# Patient Record
Sex: MALE | Race: WHITE | NOT HISPANIC OR LATINO | Employment: FULL TIME | URBAN - METROPOLITAN AREA
[De-identification: names, ages, dates, MRNs, and addresses within clinical notes are randomized per-mention and may not be internally consistent; named-entity substitution may affect disease eponyms.]

---

## 2017-03-16 ENCOUNTER — HOSPITAL ENCOUNTER (OUTPATIENT)
Dept: RADIOLOGY | Facility: CLINIC | Age: 62
Discharge: HOME | End: 2017-03-16
Payer: COMMERCIAL

## 2017-03-16 ENCOUNTER — TRANSCRIBE ORDERS (OUTPATIENT)
Dept: RADIOLOGY | Facility: CLINIC | Age: 62
End: 2017-03-16

## 2017-03-16 DIAGNOSIS — M79.671 RIGHT FOOT PAIN: Primary | ICD-10-CM

## 2017-03-16 PROCEDURE — 73630 X-RAY EXAM OF FOOT: CPT

## 2017-06-13 ENCOUNTER — TRANSCRIBE ORDERS (OUTPATIENT)
Dept: SLEEP CENTER | Facility: HOSPITAL | Age: 62
End: 2017-06-13

## 2017-06-13 ENCOUNTER — HOSPITAL ENCOUNTER (OUTPATIENT)
Dept: SLEEP CENTER | Facility: HOSPITAL | Age: 62
Discharge: HOME | End: 2017-06-13
Attending: INTERNAL MEDICINE
Payer: COMMERCIAL

## 2017-06-13 DIAGNOSIS — G47.33 OSA (OBSTRUCTIVE SLEEP APNEA): Primary | ICD-10-CM

## 2017-06-13 DIAGNOSIS — Z99.89 OSA ON CPAP: ICD-10-CM

## 2017-06-13 DIAGNOSIS — G47.33 OSA ON CPAP: ICD-10-CM

## 2017-08-24 ENCOUNTER — TRANSCRIBE ORDERS (OUTPATIENT)
Dept: ADMINISTRATIVE | Facility: HOSPITAL | Age: 62
End: 2017-08-24

## 2017-08-24 DIAGNOSIS — G47.30 SLEEP APNEA, UNSPECIFIED TYPE: Primary | ICD-10-CM

## 2017-10-10 ENCOUNTER — TRANSCRIBE ORDERS (OUTPATIENT)
Dept: SLEEP CENTER | Facility: HOSPITAL | Age: 62
End: 2017-10-10

## 2017-10-10 ENCOUNTER — HOSPITAL ENCOUNTER (OUTPATIENT)
Dept: SLEEP CENTER | Facility: HOSPITAL | Age: 62
Discharge: HOME | End: 2017-10-10
Attending: INTERNAL MEDICINE
Payer: COMMERCIAL

## 2017-10-10 DIAGNOSIS — G47.33 OSA (OBSTRUCTIVE SLEEP APNEA): Primary | ICD-10-CM

## 2017-10-10 DIAGNOSIS — G47.30 SLEEP APNEA, UNSPECIFIED TYPE: ICD-10-CM

## 2017-10-10 NOTE — PROGRESS NOTES
Follow-Up Note - [facility]  60 y o  male  XWX:4/14/3216  \[facility]\"":[id number]    CC: I saw this patient for follow-up in clinic today for his Sleep Disordered Breathing, Coexisting Sleep and Medical Problems  Past medical, Family, Social History, ROS and medications were reviewed  He reports frequent coughing due to respiratory infection for which he is under care  Herewith my findings in summary  Full Details are available on request      HPI:  He was prescribed positive airway pressure therapy and reports regular use   7 nights/week for 4 hours or more a night  He experiencing no major difficulties tolerating or adverse effectsis and is benefiting from  use  He reports a dry mouth because the humidify is not working as well as his old machine  Compliance data confirms use > 4 hours 100% of the time  The AHI is 1 4 per hour at 10 cm H2O      Sleep Routine: Alta Schaffer reports getting 7 hours sleep; he is having no difficulty initiating or maintaining sleep    He awakens feeling refreshed and reports some excessive drowsiness  He rated himself at 9 /24 on the [place] sleepiness scale  He naps when he has the opportunity  On Exam:   Physical findings are essentially unchanged from previous  Impression :   1  Obstructive Sleep Apnea  2  Mild hypersomnolence  3  Insufficient sleep may underlie this  4  Dry mouth  5  Respiratory infection  6  Comorbidities:  Hypertension, environmental allergies and obesity    Plan:  1  Treatment with  PAP is medically necessary and Alta Schaffer is agreable to use  He will continue 10 cm  pressure  2  Instruction on care of equipment and strategies to improve comfort with use of PAP were discussed  A prescription to replace supplies as needed was provided and care coordinated  He will meet with the DME provider in this regard  3  Need for compliance with therapy and weight reduction were emphasized     4  I advised him to increase the amount of sleep that he gets  5  Follow-up is advised in 1 year or sooner if needed to monitor progress and to adjust therapy  Thank you for allowing me to participate in the care of this patient      Sincerely,    Navi Poon MD  Board Certified Specialist

## 2018-01-10 NOTE — RESULT NOTES
Message   Recorded as Task   Date: 01/04/2016 10:01 AM, Created By: Candido Primrose   Task Name: Follow Up   Assigned To: [facility] [place] procedure,Team   Regarding Patient: Leon Abraham, Status: In Progress   Mireille Nighat - 04 Jan 1415 22:11 AM     TASK CREATED  S/P RIGHT L4-5 TFESI ON 12/28/2015 WITH DR Krystal Bacon - RT L3-4 TFESI SCHEDULED ON 1/11/2016     Candido Primrose - 05 Jan 4810 9:57 AM     TASK IN PROGRESS   Candido Primrose - 05 Jan 3715 0:21 AM     TASK EDITED  Lm on VM to [facility]   Breanna Coelho - 14 Jan 2016 9:55 AM     TASK EDITED  Pt arrived for injection scheduled on 1/11/16

## 2018-01-11 NOTE — MISCELLANEOUS
Message   Recorded as Task   Date: 04/15/2016 02:13 PM, Created By: Warner Mo   Task Name: Follow Up   Assigned To: [facility] clerical,Team   Regarding Patient: Kirti Limon, Status: In Progress   Comment:    Chica Siegel - 15 Apr 2016 2:13 PM     TASK CREATED  Caller: Self; Other; [phone]  [facility] 4/15/2016 @ 1058  Pt calling for Eddie Gonzales re: an insurance billing question  Pls cb  Harriett Lindo - 15 Apr 2016 2:21 PM     TASK REPLIED TO: Previously Assigned To [facility] clerical,Team  LMOM for patient to return my call  Harriett Lindo - 18 Apr 2016 1:55 PM     TASK IN Othella Hamman - 02 May 2016 12:26 PM     TASK REPLIED TO: Previously Assigned To [facility] clerical,Team  Left another message for patient  Provided him with the Central Billing office phone number to call with any billing questions [phone]        Active Problems    1  Chronic low back pain (724 2,338 29) (M54 5,G89 29)   2  Effusion of knee, unspecified laterality (719 06) (M25 469)   3  Herniated nucleus pulposus, L3-4 (722 10) (M51 26)   4  Herniated nucleus pulposus, L4-5 (722 10) (M51 26)   5  Lumbar foraminal stenosis (724 02) (M99 83)   6  Osteoarthritis of lumbosacral spine with myelopathy (721 42) (M47 16)   7  Primary osteoarthritis of left knee (715 16) (M17 12)   8  Primary osteoarthritis of right knee (715 16) (M17 11)   9  Right lumbar radiculitis (724 4) (M54 16)    Current Meds   1  Advil CAPS; Therapy: (Recorded:59Vsl6542) to Recorded   2  Lipitor 10 MG Oral Tablet (Atorvastatin Calcium); TAKE 1 TABLET DAILY; Therapy: [id number] to Recorded   3  Lisinopril 10 MG Oral Tablet; TAKE 1 TABLET DAILY; Therapy: [id number] to Recorded   4  Norvasc 5 MG Oral Tablet (AmLODIPine Besylate); TAKE 1 TABLET DAILY; Therapy: [id number] to Recorded   5  Osteo-Bi-Flex TABS; Therapy: (Recorded:32Ixd6879) to Recorded   6  Synvisc One 8 MG/ML Intra-articular Injectable; USE AS DIRECTED;    Therapy: [id number] to (Last Rx:31Oct2013) Ordered    Allergies    1  NO KNOWN DRUG ALLERGY    Signatures   Electronically signed by :  Melly Smyth, ; May  2 2016 12:26PM EST                       (Author)

## 2018-01-12 NOTE — MISCELLANEOUS
Message   Recorded as Task   Date: 03/08/2016 02:14 PM, Created By: Ida Munoz   Task Name: Follow Up   Assigned To: [facility] clerical,Team   Regarding Patient: Sophia Massey, Status: In Progress   CommentReda Carrel - 08 Mar 2016 2:14 PM     TASK CREATED  Caller: Self; General Medical Question; [phone] (Home); [phone] (Work)  Received VM from pt  on [facility] triage line from [phone] am  Pt  states that he has a question about a bill he has from his insurance company  Pt  requesting c/b  Carina Zapata - 17 Mar 2016 1:52 PM     TASK IN PROGRESS   Carina Zapata - 17 Mar 2016 2:14 PM     TASK REASSIGNED: Previously Assigned To [facility] clerical,Team  LMOM for pt to CB  See below  Delia Abdi - 25 Mar 2016 9:22 AM     TASK EDITED  S/W PT - GAVE HIM CBO #        Active Problems    1  Chronic low back pain (724 2,338 29) (M54 5,G89 29)   2  Effusion of knee, unspecified laterality (719 06) (M25 469)   3  Herniated nucleus pulposus, L3-4 (722 10) (M51 26)   4  Herniated nucleus pulposus, L4-5 (722 10) (M51 26)   5  Lumbar foraminal stenosis (724 02) (M99 83)   6  Osteoarthritis of lumbosacral spine with myelopathy (721 42) (M47 16)   7  Primary osteoarthritis of left knee (715 16) (M17 12)   8  Primary osteoarthritis of right knee (715 16) (M17 11)   9  Right lumbar radiculitis (724 4) (M54 16)    Current Meds   1  Advil CAPS; Therapy: (Recorded:85Own3785) to Recorded   2  Lipitor 10 MG Oral Tablet (Atorvastatin Calcium); TAKE 1 TABLET DAILY; Therapy: [id number] to Recorded   3  Lisinopril 10 MG Oral Tablet; TAKE 1 TABLET DAILY; Therapy: 14PXL4871 to Recorded   4  Norvasc 5 MG Oral Tablet (AmLODIPine Besylate); TAKE 1 TABLET DAILY; Therapy: 91UMU1739 to Recorded   5  Osteo-Bi-Flex TABS; Therapy: (Recorded:33Ezk3988) to Recorded   6  Synvisc One 8 MG/ML Intra-articular Injectable; USE AS DIRECTED; Therapy: [id number] to (Last Rx:31Oct2013) Ordered    Allergies    1   NO KNOWN DRUG ALLERGY    Signatures   Electronically signed by :  Barb Bustillos, ; Mar 25 2016  9:22AM EST                       (Author)

## 2018-01-13 NOTE — MISCELLANEOUS
Message   Recorded as Task   Date: 01/18/2016 09:53 AM, Created By: Paulina Gregorio   Task Name: Follow Up   Assigned To: [facility] ,Team   Regarding Patient: Elizabeth Chase, Status: In Progress   Comment:    Breanna Coelho - 18 Jan 2016 9:53 AM     TASK CREATED  S/p Right L3-4 TFESI on 1/11/16 w/Dr Rony Mckoy in [place]   F/u scheduled 2/8/16   Breanna Coelho - 20 Jan 2016 1:13 PM     TASK EDITED  1st attempt-lmom for f/u after injection   Breanna Coelho - 25 Jan 2016 12:35 PM     TASK EDITED  2nd attempt-lm to return call for f/u after injection   Breanna Coelho - 27 Jan 2016 3:49 PM     TASK REASSIGNED: Previously Assigned To [facility] [place] procedure,Team  Please send Can't reach you letter   Can't reach you letter sent to patients address  Active Problems    1  Effusion of knee, unspecified laterality (719 06) (M25 469)   2  Herniation of lumbar intervertebral disc with radiculopathy (722 10) (M51 16)   3  Lumbar foraminal stenosis (724 02) (M99 83)   4  Primary osteoarthritis of left knee (715 16) (M17 12)   5  Primary osteoarthritis of right knee (715 16) (M17 11)   6  Right lumbar radiculitis (724 4) (M54 16)    Current Meds   1  Advil CAPS; Therapy: (Recorded:99Dgz2847) to Recorded   2  Lipitor 10 MG Oral Tablet (Atorvastatin Calcium); TAKE 1 TABLET DAILY; Therapy: [id number] to Recorded   3  Lisinopril 10 MG Oral Tablet; TAKE 1 TABLET DAILY; Therapy: [id number] to Recorded   4  Norvasc 5 MG Oral Tablet (AmLODIPine Besylate); TAKE 1 TABLET DAILY; Therapy: 64SXZ0534 to Recorded   5  Osteo-Bi-Flex TABS; Therapy: (Recorded:23Bzm1703) to Recorded   6  Synvisc One 8 MG/ML Intra-articular Injectable; USE AS DIRECTED; Therapy: [id number] to (Last Rx:31Oct2013) Ordered    Allergies    1   NO KNOWN DRUG ALLERGY    Signatures   Electronically signed by : Noah Tijerina, ; Jan 28 2016 11:46AM EST                       (Author)

## 2018-06-18 ENCOUNTER — TRANSCRIBE ORDERS (OUTPATIENT)
Dept: SLEEP CENTER | Facility: HOSPITAL | Age: 63
End: 2018-06-18

## 2018-06-18 DIAGNOSIS — G47.33 OBSTRUCTIVE SLEEP APNEA: Primary | ICD-10-CM

## 2018-10-11 ENCOUNTER — TRANSCRIBE ORDERS (OUTPATIENT)
Dept: SLEEP CENTER | Facility: HOSPITAL | Age: 63
End: 2018-10-11

## 2018-10-11 DIAGNOSIS — Z99.89 DEPENDENCE ON OTHER ENABLING MACHINES AND DEVICES: ICD-10-CM

## 2018-10-11 DIAGNOSIS — G47.30 SLEEP APNEA: Primary | ICD-10-CM

## 2018-10-11 DIAGNOSIS — G47.33 OBSTRUCTIVE SLEEP APNEA: ICD-10-CM

## 2018-10-16 ENCOUNTER — OFFICE VISIT (OUTPATIENT)
Dept: SLEEP CENTER | Facility: HOSPITAL | Age: 63
End: 2018-10-16
Attending: INTERNAL MEDICINE
Payer: COMMERCIAL

## 2018-10-16 VITALS
SYSTOLIC BLOOD PRESSURE: 110 MMHG | WEIGHT: 211 LBS | HEIGHT: 71 IN | BODY MASS INDEX: 29.54 KG/M2 | DIASTOLIC BLOOD PRESSURE: 68 MMHG | HEART RATE: 72 BPM

## 2018-10-16 DIAGNOSIS — R68.2 DRY MOUTH: ICD-10-CM

## 2018-10-16 DIAGNOSIS — E66.3 OVERWEIGHT (BMI 25.0-29.9): ICD-10-CM

## 2018-10-16 DIAGNOSIS — J31.0 CHRONIC RHINITIS: ICD-10-CM

## 2018-10-16 DIAGNOSIS — G47.62 NOCTURNAL LEG CRAMPS: ICD-10-CM

## 2018-10-16 DIAGNOSIS — I10 ESSENTIAL HYPERTENSION: ICD-10-CM

## 2018-10-16 DIAGNOSIS — G47.33 OSA (OBSTRUCTIVE SLEEP APNEA): Primary | ICD-10-CM

## 2018-10-16 DIAGNOSIS — Z99.89 DEPENDENCE ON OTHER ENABLING MACHINES AND DEVICES: ICD-10-CM

## 2018-10-16 PROCEDURE — 99214 OFFICE O/P EST MOD 30 MIN: CPT | Performed by: INTERNAL MEDICINE

## 2018-10-16 RX ORDER — LISINOPRIL 10 MG/1
1 TABLET ORAL DAILY
COMMUNITY
Start: 2013-10-29

## 2018-10-16 RX ORDER — ATORVASTATIN CALCIUM 10 MG/1
1 TABLET, FILM COATED ORAL DAILY
COMMUNITY
Start: 2013-10-29

## 2018-10-16 NOTE — PROGRESS NOTES
Follow-Up Note - [facility]  58 y o  male  AFF:4/60/2459  HEW:[id number]    CC: I saw this patient for follow-up in clinic today for his Sleep Disordered Breathing, Coexisting Sleep and Medical Problems  PFSH, Problem List, Medications & Allergies were reviewed in EMR  Interval changes: none reported  He  has no past medical history on file  He has a current medication list which includes the following prescription(s): atorvastatin and lisinopril  ROS: Reviewed (see attached)  He has had approximately 45 lb intentional weight loss in the past 1 year  HPI: Whit Valencia reports no  difficulty tolerating PAP; With respect to compliance, data download shows:  using PAP > 4 hours/night 83% of the time  BERENICE (estimated) 2 9/hour at  pressure of 10cm H2O     · He is experiencing some adverse effects: dry mouth   · He is benefitting from use   · He reports leg cramps that awaken him from sleep  Sleep Routine: He reports getting 7 5 hours sleep  ; he having difficulty initiating sleep, but not maintaining sleep   He awakens with the aid of an alarm feeling refreshed  He denied excessive drowsiness   He rated himself at Total score: 10 /24 on the [place] sleepiness scale  Habits: reports that he quit smoking about 35 years ago  He has never used smokeless tobacco ,  reports that he drinks alcohol ,  reports that he does not use drugs  , Caffeine use: limited , Exercise routine: regular    EXAM: /68   Pulse 72   Ht 5' 11" (1 803 m)   Wt 95 7 kg (211 lb)   BMI 29 43 kg/m²      Patient is well groomed; well appearing; no deformity  He is alert, orientated, cooperative and in no distress  Mental state appears normal   Skin/Extrem: warm & dry; col & hydration normal; no edema  EOMI; There are no facial pressure marks or rashes  Neck Circumference: 44 cm  Nasal airway is patent  Oral airway is crowded  Mucous membranes appeared normal  RRR;  Resp effort is normal  There is truncal obesity  Gait & Balance normal   Speech is clear & coherent  IMPRESSION: Primary Sleep/Secondary(to Medical or Psych conditions) & comorbidities   1  CARMEN (obstructive sleep apnea)  Sleep F/U  - established patient    Sleep F/U  - established patient    Ambulatory referral to Sleep Medicine    Ambulatory referral to Sleep Medicine    PAP DME Resupply/Reorder    PAP DME Pressure Change   2  Dependence on other enabling machines and devices  Ambulatory referral to Sleep Medicine   3  Dry mouth     4  Nocturnal leg cramps     5  Chronic rhinitis     6  Essential hypertension     7  Overweight (BMI 25 0-29  9)         PLAN:  1  Treatment with  PAP is medically necessary and Darlene Niño is agreable to continue use  2  Care of equipment, methods to improve comfort using PAP and importance of compliance with therapy were discussed  I advised on strategies to improve dryness  Specifically, using Biotene mouthwash or Xylimelt  3  Pressure setting:  Change 8-10 cm H2O in view of dryness and weight loss  I suggested re-evaluation by way of an overnight sleep study but he declined  4  Rx provided to replace supplies and Care coordinated with DME provider  5  Strategies for weight reduction were discussed  6  I suggested adding magnesium supplement for the leg cramps  7  Follow-up is advised in 1 year or sooner if needed to monitor progress, compliance and to adjust therapy  Thank you for allowing me to participate in the care of this patient      Sincerely,    Authenticated electronically by Mao Interiano MD on 48/99/76   Board Certified Specialist

## 2019-10-29 ENCOUNTER — TELEPHONE (OUTPATIENT)
Dept: SLEEP CENTER | Facility: HOSPITAL | Age: 64
End: 2019-10-29

## 2020-05-28 ENCOUNTER — TELEPHONE (OUTPATIENT)
Dept: GASTROENTEROLOGY | Facility: CLINIC | Age: 65
End: 2020-05-28

## 2020-06-15 ENCOUNTER — HOSPITAL ENCOUNTER (EMERGENCY)
Facility: HOSPITAL | Age: 65
Discharge: HOME | End: 2020-06-15
Attending: EMERGENCY MEDICINE
Payer: COMMERCIAL

## 2020-06-15 ENCOUNTER — APPOINTMENT (EMERGENCY)
Dept: CT IMAGING | Facility: HOSPITAL | Age: 65
End: 2020-06-15
Payer: COMMERCIAL

## 2020-06-15 VITALS
BODY MASS INDEX: 29.43 KG/M2 | DIASTOLIC BLOOD PRESSURE: 96 MMHG | SYSTOLIC BLOOD PRESSURE: 173 MMHG | HEART RATE: 65 BPM | OXYGEN SATURATION: 97 % | RESPIRATION RATE: 19 BRPM | WEIGHT: 211 LBS | TEMPERATURE: 98 F

## 2020-06-15 DIAGNOSIS — S01.01XA LACERATION OF SCALP, INITIAL ENCOUNTER: Primary | ICD-10-CM

## 2020-06-15 PROCEDURE — 12001 RPR S/N/AX/GEN/TRNK 2.5CM/<: CPT | Performed by: EMERGENCY MEDICINE

## 2020-06-15 PROCEDURE — 99284 EMERGENCY DEPT VISIT MOD MDM: CPT

## 2020-06-15 PROCEDURE — 70450 CT HEAD/BRAIN W/O DYE: CPT

## 2020-06-15 PROCEDURE — 99284 EMERGENCY DEPT VISIT MOD MDM: CPT | Performed by: EMERGENCY MEDICINE

## 2020-06-15 RX ORDER — TRANEXAMIC ACID 100 MG/ML
1000 INJECTION, SOLUTION INTRAVENOUS ONCE
Status: DISCONTINUED | OUTPATIENT
Start: 2020-06-15 | End: 2020-06-16 | Stop reason: HOSPADM

## 2020-06-15 RX ORDER — GINSENG 100 MG
1 CAPSULE ORAL ONCE
Status: COMPLETED | OUTPATIENT
Start: 2020-06-15 | End: 2020-06-15

## 2020-06-15 RX ORDER — NAPROXEN 500 MG/1
TABLET ORAL EVERY 12 HOURS
COMMUNITY
Start: 2017-03-16 | End: 2022-05-26 | Stop reason: ALTCHOICE

## 2020-06-15 RX ORDER — LIDOCAINE HYDROCHLORIDE AND EPINEPHRINE 10; 10 MG/ML; UG/ML
1 INJECTION, SOLUTION INFILTRATION; PERINEURAL ONCE
Status: COMPLETED | OUTPATIENT
Start: 2020-06-15 | End: 2020-06-15

## 2020-06-15 RX ORDER — ASPIRIN 81 MG/1
81 TABLET, CHEWABLE ORAL DAILY
COMMUNITY

## 2020-06-15 RX ORDER — COLCHICINE 0.6 MG/1
CAPSULE ORAL AS NEEDED
COMMUNITY
Start: 2020-05-29

## 2020-06-15 RX ADMIN — BACITRACIN 1 SMALL APPLICATION: 500 OINTMENT TOPICAL at 23:10

## 2020-06-15 RX ADMIN — LIDOCAINE HYDROCHLORIDE AND EPINEPHRINE 1 ML: 10; 10 INJECTION, SOLUTION INFILTRATION; PERINEURAL at 20:41

## 2020-07-08 ENCOUNTER — TELEPHONE (OUTPATIENT)
Dept: GASTROENTEROLOGY | Facility: CLINIC | Age: 65
End: 2020-07-08

## 2020-10-15 ENCOUNTER — TELEMEDICINE (OUTPATIENT)
Dept: GASTROENTEROLOGY | Facility: CLINIC | Age: 65
End: 2020-10-15

## 2020-10-15 VITALS — WEIGHT: 215 LBS | BODY MASS INDEX: 30.78 KG/M2 | HEIGHT: 70 IN

## 2020-10-15 DIAGNOSIS — Z80.0 FAMILY HISTORY OF COLON CANCER: ICD-10-CM

## 2020-10-15 DIAGNOSIS — Z86.010 HISTORY OF COLON POLYPS: Primary | ICD-10-CM

## 2020-10-15 RX ORDER — METOPROLOL SUCCINATE 50 MG/1
50 TABLET, EXTENDED RELEASE ORAL DAILY
COMMUNITY

## 2020-10-21 ENCOUNTER — HOSPITAL ENCOUNTER (OUTPATIENT)
Dept: GASTROENTEROLOGY | Facility: AMBULATORY SURGERY CENTER | Age: 65
Discharge: HOME | End: 2020-10-21
Payer: COMMERCIAL

## 2020-10-21 ENCOUNTER — ANESTHESIA EVENT (OUTPATIENT)
Dept: GASTROENTEROLOGY | Facility: AMBULATORY SURGERY CENTER | Age: 65
End: 2020-10-21

## 2020-10-21 ENCOUNTER — ANESTHESIA (OUTPATIENT)
Dept: GASTROENTEROLOGY | Facility: AMBULATORY SURGERY CENTER | Age: 65
End: 2020-10-21

## 2020-10-21 VITALS — HEART RATE: 74 BPM

## 2020-10-21 VITALS
BODY MASS INDEX: 30.78 KG/M2 | DIASTOLIC BLOOD PRESSURE: 69 MMHG | TEMPERATURE: 97.1 F | WEIGHT: 215 LBS | OXYGEN SATURATION: 96 % | HEART RATE: 67 BPM | RESPIRATION RATE: 18 BRPM | SYSTOLIC BLOOD PRESSURE: 114 MMHG | HEIGHT: 70 IN

## 2020-10-21 DIAGNOSIS — Z80.0 FAMILY HISTORY OF COLON CANCER: ICD-10-CM

## 2020-10-21 DIAGNOSIS — Z86.010 HISTORY OF COLON POLYPS: ICD-10-CM

## 2020-10-21 PROCEDURE — 45380 COLONOSCOPY AND BIOPSY: CPT | Performed by: INTERNAL MEDICINE

## 2020-10-21 PROCEDURE — 88305 TISSUE EXAM BY PATHOLOGIST: CPT | Performed by: PATHOLOGY

## 2020-10-21 RX ORDER — IBUPROFEN 200 MG
200 TABLET ORAL EVERY 6 HOURS PRN
COMMUNITY

## 2020-10-21 RX ORDER — PROPOFOL 10 MG/ML
INJECTION, EMULSION INTRAVENOUS AS NEEDED
Status: DISCONTINUED | OUTPATIENT
Start: 2020-10-21 | End: 2020-10-21

## 2020-10-21 RX ORDER — SODIUM CHLORIDE 9 MG/ML
75 INJECTION, SOLUTION INTRAVENOUS CONTINUOUS
Status: DISCONTINUED | OUTPATIENT
Start: 2020-10-21 | End: 2020-10-25 | Stop reason: HOSPADM

## 2020-10-21 RX ADMIN — PROPOFOL 50 MG: 10 INJECTION, EMULSION INTRAVENOUS at 08:23

## 2020-10-21 RX ADMIN — PROPOFOL 50 MG: 10 INJECTION, EMULSION INTRAVENOUS at 08:33

## 2020-10-21 RX ADMIN — PROPOFOL 150 MG: 10 INJECTION, EMULSION INTRAVENOUS at 08:19

## 2020-10-21 RX ADMIN — PROPOFOL 50 MG: 10 INJECTION, EMULSION INTRAVENOUS at 08:21

## 2020-10-21 RX ADMIN — PROPOFOL 50 MG: 10 INJECTION, EMULSION INTRAVENOUS at 08:28

## 2020-10-21 RX ADMIN — SODIUM CHLORIDE 75 ML/HR: 9 INJECTION, SOLUTION INTRAVENOUS at 07:55

## 2020-10-22 ENCOUNTER — OFFICE VISIT (OUTPATIENT)
Dept: WOUND CARE | Facility: HOSPITAL | Age: 65
End: 2020-10-22
Payer: COMMERCIAL

## 2020-10-22 VITALS
RESPIRATION RATE: 18 BRPM | HEIGHT: 70 IN | DIASTOLIC BLOOD PRESSURE: 98 MMHG | HEART RATE: 60 BPM | BODY MASS INDEX: 30.78 KG/M2 | SYSTOLIC BLOOD PRESSURE: 158 MMHG | TEMPERATURE: 97.2 F | WEIGHT: 215 LBS

## 2020-10-22 DIAGNOSIS — T25.021A BURN OF UNSPECIFIED DEGREE OF RIGHT FOOT, INITIAL ENCOUNTER: Primary | ICD-10-CM

## 2020-10-22 DIAGNOSIS — T31.0 BURN (ANY DEGREE) INVOLVING LESS THAN 10% OF BODY SURFACE: ICD-10-CM

## 2020-10-22 PROCEDURE — 99213 OFFICE O/P EST LOW 20 MIN: CPT | Performed by: FAMILY MEDICINE

## 2020-10-22 PROCEDURE — 16020 DRESS/DEBRID P-THICK BURN S: CPT | Performed by: FAMILY MEDICINE

## 2020-10-22 PROCEDURE — 99203 OFFICE O/P NEW LOW 30 MIN: CPT | Performed by: FAMILY MEDICINE

## 2020-10-22 PROCEDURE — G0463 HOSPITAL OUTPT CLINIC VISIT: HCPCS | Performed by: FAMILY MEDICINE

## 2020-10-22 RX ORDER — LIDOCAINE HYDROCHLORIDE 40 MG/ML
5 SOLUTION TOPICAL ONCE
Status: COMPLETED | OUTPATIENT
Start: 2020-10-22 | End: 2020-10-22

## 2020-10-22 RX ADMIN — LIDOCAINE HYDROCHLORIDE 5 ML: 40 SOLUTION TOPICAL at 10:51

## 2020-10-29 ENCOUNTER — OFFICE VISIT (OUTPATIENT)
Dept: WOUND CARE | Facility: HOSPITAL | Age: 65
End: 2020-10-29
Payer: COMMERCIAL

## 2020-10-29 VITALS
HEART RATE: 64 BPM | SYSTOLIC BLOOD PRESSURE: 130 MMHG | TEMPERATURE: 96.5 F | RESPIRATION RATE: 18 BRPM | DIASTOLIC BLOOD PRESSURE: 90 MMHG

## 2020-10-29 DIAGNOSIS — T25.021A BURN OF UNSPECIFIED DEGREE OF RIGHT FOOT, INITIAL ENCOUNTER: Primary | ICD-10-CM

## 2020-10-29 PROCEDURE — 16020 DRESS/DEBRID P-THICK BURN S: CPT | Performed by: FAMILY MEDICINE

## 2020-10-29 RX ORDER — LIDOCAINE HYDROCHLORIDE 40 MG/ML
5 SOLUTION TOPICAL ONCE
Status: COMPLETED | OUTPATIENT
Start: 2020-10-29 | End: 2020-10-29

## 2020-10-29 RX ADMIN — LIDOCAINE HYDROCHLORIDE 5 ML: 40 SOLUTION TOPICAL at 10:30

## 2020-11-05 ENCOUNTER — OFFICE VISIT (OUTPATIENT)
Dept: WOUND CARE | Facility: HOSPITAL | Age: 65
End: 2020-11-05
Payer: COMMERCIAL

## 2020-11-05 VITALS
HEART RATE: 76 BPM | DIASTOLIC BLOOD PRESSURE: 70 MMHG | RESPIRATION RATE: 18 BRPM | TEMPERATURE: 96.7 F | SYSTOLIC BLOOD PRESSURE: 130 MMHG

## 2020-11-05 DIAGNOSIS — T31.0 BURN (ANY DEGREE) INVOLVING LESS THAN 10% OF BODY SURFACE: ICD-10-CM

## 2020-11-05 DIAGNOSIS — T25.021A BURN OF UNSPECIFIED DEGREE OF RIGHT FOOT, INITIAL ENCOUNTER: Primary | ICD-10-CM

## 2020-11-05 PROCEDURE — 16020 DRESS/DEBRID P-THICK BURN S: CPT | Performed by: FAMILY MEDICINE

## 2020-11-05 PROCEDURE — NC001 PR NO CHARGE: Performed by: FAMILY MEDICINE

## 2020-11-05 RX ORDER — LIDOCAINE HYDROCHLORIDE 40 MG/ML
1 SOLUTION TOPICAL ONCE
Status: COMPLETED | OUTPATIENT
Start: 2020-11-05 | End: 2020-11-05

## 2020-11-05 RX ADMIN — LIDOCAINE HYDROCHLORIDE 1 ML: 40 SOLUTION TOPICAL at 09:58

## 2020-11-12 ENCOUNTER — OFFICE VISIT (OUTPATIENT)
Dept: WOUND CARE | Facility: HOSPITAL | Age: 65
End: 2020-11-12
Payer: COMMERCIAL

## 2020-11-12 VITALS
HEART RATE: 70 BPM | SYSTOLIC BLOOD PRESSURE: 134 MMHG | RESPIRATION RATE: 18 BRPM | DIASTOLIC BLOOD PRESSURE: 84 MMHG | TEMPERATURE: 96.5 F

## 2020-11-12 DIAGNOSIS — T31.0 BURN (ANY DEGREE) INVOLVING LESS THAN 10% OF BODY SURFACE: Primary | ICD-10-CM

## 2020-11-12 PROCEDURE — 16020 DRESS/DEBRID P-THICK BURN S: CPT | Performed by: FAMILY MEDICINE

## 2020-11-12 RX ORDER — LIDOCAINE HYDROCHLORIDE 40 MG/ML
1 SOLUTION TOPICAL ONCE
Status: COMPLETED | OUTPATIENT
Start: 2020-11-12 | End: 2020-11-12

## 2020-11-12 RX ADMIN — LIDOCAINE HYDROCHLORIDE 1 ML: 40 SOLUTION TOPICAL at 10:09

## 2020-11-19 ENCOUNTER — OFFICE VISIT (OUTPATIENT)
Dept: WOUND CARE | Facility: HOSPITAL | Age: 65
End: 2020-11-19
Payer: COMMERCIAL

## 2020-11-19 VITALS
DIASTOLIC BLOOD PRESSURE: 82 MMHG | HEART RATE: 80 BPM | TEMPERATURE: 95.4 F | RESPIRATION RATE: 20 BRPM | SYSTOLIC BLOOD PRESSURE: 140 MMHG

## 2020-11-19 DIAGNOSIS — T31.0 BURN (ANY DEGREE) INVOLVING LESS THAN 10% OF BODY SURFACE: Primary | ICD-10-CM

## 2020-11-19 PROCEDURE — 99213 OFFICE O/P EST LOW 20 MIN: CPT | Performed by: FAMILY MEDICINE

## 2020-11-19 PROCEDURE — 99212 OFFICE O/P EST SF 10 MIN: CPT | Performed by: FAMILY MEDICINE

## 2020-11-19 PROCEDURE — G0463 HOSPITAL OUTPT CLINIC VISIT: HCPCS | Performed by: FAMILY MEDICINE

## 2020-12-03 ENCOUNTER — OFFICE VISIT (OUTPATIENT)
Dept: WOUND CARE | Facility: HOSPITAL | Age: 65
End: 2020-12-03
Payer: COMMERCIAL

## 2020-12-03 VITALS
RESPIRATION RATE: 18 BRPM | DIASTOLIC BLOOD PRESSURE: 90 MMHG | HEART RATE: 68 BPM | TEMPERATURE: 97 F | SYSTOLIC BLOOD PRESSURE: 132 MMHG

## 2020-12-03 DIAGNOSIS — T31.0 BURN (ANY DEGREE) INVOLVING LESS THAN 10% OF BODY SURFACE: ICD-10-CM

## 2020-12-03 DIAGNOSIS — T25.021A BURN OF UNSPECIFIED DEGREE OF RIGHT FOOT, INITIAL ENCOUNTER: Primary | ICD-10-CM

## 2020-12-03 PROCEDURE — 11042 DBRDMT SUBQ TIS 1ST 20SQCM/<: CPT | Performed by: FAMILY MEDICINE

## 2020-12-03 PROCEDURE — 16020 DRESS/DEBRID P-THICK BURN S: CPT | Performed by: FAMILY MEDICINE

## 2020-12-10 ENCOUNTER — OFFICE VISIT (OUTPATIENT)
Dept: WOUND CARE | Facility: HOSPITAL | Age: 65
End: 2020-12-10
Payer: COMMERCIAL

## 2020-12-10 VITALS
SYSTOLIC BLOOD PRESSURE: 124 MMHG | HEART RATE: 80 BPM | RESPIRATION RATE: 16 BRPM | TEMPERATURE: 97.5 F | DIASTOLIC BLOOD PRESSURE: 80 MMHG

## 2020-12-10 DIAGNOSIS — T31.0 BURN (ANY DEGREE) INVOLVING LESS THAN 10% OF BODY SURFACE: ICD-10-CM

## 2020-12-10 DIAGNOSIS — T25.021A BURN OF UNSPECIFIED DEGREE OF RIGHT FOOT, INITIAL ENCOUNTER: Primary | ICD-10-CM

## 2020-12-10 PROCEDURE — 16020 DRESS/DEBRID P-THICK BURN S: CPT | Performed by: FAMILY MEDICINE

## 2020-12-10 RX ORDER — LIDOCAINE HYDROCHLORIDE 40 MG/ML
5 SOLUTION TOPICAL ONCE
Status: COMPLETED | OUTPATIENT
Start: 2020-12-10 | End: 2020-12-10

## 2020-12-10 RX ADMIN — LIDOCAINE HYDROCHLORIDE 5 ML: 40 SOLUTION TOPICAL at 10:00

## 2020-12-28 ENCOUNTER — OFFICE VISIT (OUTPATIENT)
Dept: WOUND CARE | Facility: HOSPITAL | Age: 65
End: 2020-12-28
Payer: COMMERCIAL

## 2020-12-28 VITALS
TEMPERATURE: 97.7 F | DIASTOLIC BLOOD PRESSURE: 90 MMHG | HEART RATE: 64 BPM | SYSTOLIC BLOOD PRESSURE: 148 MMHG | RESPIRATION RATE: 16 BRPM

## 2020-12-28 DIAGNOSIS — T31.0 BURN (ANY DEGREE) INVOLVING LESS THAN 10% OF BODY SURFACE: Primary | ICD-10-CM

## 2020-12-28 PROCEDURE — 16020 DRESS/DEBRID P-THICK BURN S: CPT | Performed by: FAMILY MEDICINE

## 2020-12-28 RX ORDER — LIDOCAINE HYDROCHLORIDE 40 MG/ML
5 SOLUTION TOPICAL ONCE
Status: COMPLETED | OUTPATIENT
Start: 2020-12-28 | End: 2020-12-28

## 2020-12-28 RX ADMIN — LIDOCAINE HYDROCHLORIDE 5 ML: 40 SOLUTION TOPICAL at 08:51

## 2021-01-04 ENCOUNTER — OFFICE VISIT (OUTPATIENT)
Dept: WOUND CARE | Facility: HOSPITAL | Age: 66
End: 2021-01-04
Payer: COMMERCIAL

## 2021-01-04 VITALS
HEART RATE: 62 BPM | RESPIRATION RATE: 18 BRPM | DIASTOLIC BLOOD PRESSURE: 90 MMHG | TEMPERATURE: 97.2 F | SYSTOLIC BLOOD PRESSURE: 140 MMHG

## 2021-01-04 DIAGNOSIS — T31.0 BURN (ANY DEGREE) INVOLVING LESS THAN 10% OF BODY SURFACE: Primary | ICD-10-CM

## 2021-01-04 PROCEDURE — G0463 HOSPITAL OUTPT CLINIC VISIT: HCPCS | Performed by: FAMILY MEDICINE

## 2021-01-04 PROCEDURE — 99213 OFFICE O/P EST LOW 20 MIN: CPT | Performed by: FAMILY MEDICINE

## 2021-01-04 PROCEDURE — 99212 OFFICE O/P EST SF 10 MIN: CPT | Performed by: FAMILY MEDICINE

## 2021-01-04 NOTE — PROGRESS NOTES
Patient ID: Martín Young is a 76 y o  male Date of Birth 1955     Chief Complaint  Chief Complaint   Patient presents with    Follow Up Wound Care Visit       Allergies  Patient has no known allergies  Assessment:    Burn of unspecified degree of right foot, initial encounter  Wound is closed  Continue keep the area covered and protected for about another week  Follow up p r n  Or call with questions or concerns       Diagnoses and all orders for this visit:    Burn (any degree) involving less than 10% of body surface  -     Wound cleansing and dressings; Future              Procedures    Plan:     Wound 10/22/20 Burn Foot Right;Lateral;Distal (Active)   Wound Image Images linked 01/04/21 0952   Wound Description Intact; Epithelialization 01/04/21 0952   Lacie-wound Assessment Callus 01/04/21 0952   Wound Length (cm) 0 cm 01/04/21 0952   Wound Width (cm) 0 cm 01/04/21 0952   Wound Depth (cm) 0 cm 01/04/21 0952   Wound Surface Area (cm^2) 0 cm^2 01/04/21 0952   Wound Volume (cm^3) 0 cm^3 01/04/21 0952   Calculated Wound Volume (cm^3) 0 cm^3 01/04/21 0952   Change in Wound Size % 100 01/04/21 0952   Drainage Amount None 01/04/21 0952   Non-staged Wound Description Not applicable 75/61/97 6796   Dressing Status Intact 01/04/21 0952       Wound 10/22/20 Burn Foot Right;Lateral;Distal (Active)   Date First Assessed/Time First Assessed: 10/22/20 1035   Primary Wound Type: Burn  Location: Foot  Wound Location Orientation: Right;Lateral;Distal       Subjective:        Patient presents for followup of a burn of the right lateral foot  Has been using purachol Ag and covering with mepilex  He also has been spending more time in a slipper instead of his regular shoe this past week  The following portions of the patient's history were reviewed and updated as appropriate: He  has a past medical history of Arthritis, Asthma, Colon polyp, Hypercholesterolemia, Hypertension, and Sleep apnea    He  reports that he quit smoking about 37 years ago  His smoking use included cigarettes  He has never used smokeless tobacco  He reports current alcohol use  He reports that he does not use drugs  He has No Known Allergies       Review of Systems   Constitutional: Negative for chills and fever  HENT: Negative for congestion and sneezing  Respiratory: Negative for cough  Musculoskeletal: Negative for gait problem  Skin: Positive for wound  Psychiatric/Behavioral: Negative for agitation  Objective:       Wound 10/22/20 Burn Foot Right;Lateral;Distal (Active)   Wound Image Images linked 01/04/21 0952   Wound Description Intact; Epithelialization 01/04/21 0952   Lacie-wound Assessment Callus 01/04/21 0952   Wound Length (cm) 0 cm 01/04/21 0952   Wound Width (cm) 0 cm 01/04/21 0952   Wound Depth (cm) 0 cm 01/04/21 0952   Wound Surface Area (cm^2) 0 cm^2 01/04/21 0952   Wound Volume (cm^3) 0 cm^3 01/04/21 0952   Calculated Wound Volume (cm^3) 0 cm^3 01/04/21 0952   Change in Wound Size % 100 01/04/21 0952   Drainage Amount None 01/04/21 0952   Non-staged Wound Description Not applicable 19/36/19 0537   Dressing Status Intact 01/04/21 0952       /90   Pulse 62   Temp (!) 97 2 °F (36 2 °C)   Resp 18     Physical Exam  Constitutional:       General: He is not in acute distress  Appearance: Normal appearance  He is not ill-appearing or toxic-appearing  HENT:      Head: Normocephalic and atraumatic  Right Ear: External ear normal       Left Ear: External ear normal    Eyes:      Conjunctiva/sclera: Conjunctivae normal    Neck:      Musculoskeletal: Neck supple  Pulmonary:      Effort: Pulmonary effort is normal  No respiratory distress  Skin:     Comments: Wound appears closed   Neurological:      Mental Status: He is alert        Gait: Gait normal    Psychiatric:         Mood and Affect: Mood normal          Behavior: Behavior normal            Wound Instructions:  Orders Placed This Encounter Procedures    Wound cleansing and dressings     Your wound is healed  Keep covered with mepilex or allevyn foam for another 7-10 days  Try to wear slippers as much as you can  Call if wound reopens  Keep legs elevated  Standing Status:   Future     Standing Expiration Date:   1/4/2022        Diagnosis ICD-10-CM Associated Orders   1   Burn (any degree) involving less than 10% of body surface  T31 0 Wound cleansing and dressings

## 2021-01-04 NOTE — PATIENT INSTRUCTIONS
Orders Placed This Encounter   Procedures    Wound cleansing and dressings     Your wound is healed  Keep covered with mepilex or allevyn foam for another 7-10 days  Try to wear slippers as much as you can  Call if wound reopens  Keep legs elevated       Standing Status:   Future     Standing Expiration Date:   1/4/2022

## 2021-01-04 NOTE — ASSESSMENT & PLAN NOTE
Wound is closed  Continue keep the area covered and protected for about another week  Follow up p r n   Or call with questions or concerns

## 2021-02-03 ENCOUNTER — HOSPITAL ENCOUNTER (OUTPATIENT)
Dept: RADIOLOGY | Facility: HOSPITAL | Age: 66
Discharge: HOME | End: 2021-02-03
Payer: COMMERCIAL

## 2021-02-03 ENCOUNTER — TRANSCRIBE ORDERS (OUTPATIENT)
Dept: ADMINISTRATIVE | Facility: HOSPITAL | Age: 66
End: 2021-02-03

## 2021-02-03 DIAGNOSIS — M54.9 DORSALGIA: ICD-10-CM
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
## 2021-02-03 DIAGNOSIS — M54.9 DORSALGIA: Primary | ICD-10-CM

## 2021-02-03 PROCEDURE — 72100 X-RAY EXAM L-S SPINE 2/3 VWS: CPT

## 2021-02-03 PROCEDURE — 72072 X-RAY EXAM THORAC SPINE 3VWS: CPT

## 2021-02-17 ENCOUNTER — EVALUATION (OUTPATIENT)
Dept: PHYSICAL THERAPY | Facility: CLINIC | Age: 66
End: 2021-02-17
Payer: COMMERCIAL

## 2021-02-17 ENCOUNTER — TRANSCRIBE ORDERS (OUTPATIENT)
Dept: PHYSICAL THERAPY | Facility: CLINIC | Age: 66
End: 2021-02-17

## 2021-02-17 DIAGNOSIS — M54.50 ACUTE BILATERAL LOW BACK PAIN WITHOUT SCIATICA: Primary | ICD-10-CM

## 2021-02-17 DIAGNOSIS — M54.50 LUMBAR PAIN: ICD-10-CM

## 2021-02-17 PROCEDURE — 97161 PT EVAL LOW COMPLEX 20 MIN: CPT | Performed by: PHYSICAL THERAPIST

## 2021-02-17 RX ORDER — METHOCARBAMOL 750 MG/1
750 TABLET, FILM COATED ORAL EVERY 6 HOURS PRN
COMMUNITY
End: 2022-05-26 | Stop reason: ALTCHOICE

## 2021-02-17 RX ORDER — TRAMADOL HYDROCHLORIDE 50 MG/1
50 TABLET ORAL EVERY 6 HOURS PRN
COMMUNITY
End: 2022-05-26 | Stop reason: ALTCHOICE

## 2021-02-17 NOTE — PROGRESS NOTES
PT Evaluation     Today's date: 2021  Patient name: Taryn Bates  : 1955  MRN: [id number]  Referring provider: Ashley Awad MD  Dx:   Encounter Diagnosis     ICD-10-CM    1  Acute bilateral low back pain without sciatica  M54 5    2  Lumbar pain  M54 5        Start Time: 08          Assessment  Assessment details: Taryn Bates is a 76 y o  male presenting to outpatient physical therapy at [place] [address] with complaints of low back pain   They present with decreased range of motion, decreased strength, limited flexibility, poor postural awareness, poor body mechanics, poor balance, decreased tolerance to activity and decreased functional mobility  Lana Brown would benefit from skilled physical therapy to address noted impairments in order to allow for full functional return to work and ADL-related activities  Thank you for the referral!  Impairments: abnormal muscle firing, abnormal or restricted ROM, activity intolerance, impaired balance, impaired physical strength, lacks appropriate home exercise program, pain with function and poor body mechanics  Barriers to therapy: n/a  Understanding of Dx/Px/POC: excellent  Goals  ST   Independent with HEP in 2 weeks  2  Decrease pain by 50% in 3 wks  3   Increase Lumbar ROM to [facility] all planes and pain free in 3 weeks     LT  Achieve FOTO score of 75/100 in 6 weeks   2   Able to perform supine to seated transfer with no assist, nonpainful in 6 weeks  3  Able to sleep in his bed for a full night in 6 weeks  Plan  Plan details: RE in 6 weeks    Patient would benefit from: skilled physical therapy  Planned modality interventions: cryotherapy, electrical stimulation/Russian stimulation and thermotherapy: hydrocollator packs  Planned therapy interventions: abdominal trunk stabilization, manual therapy, neuromuscular re-education, therapeutic activities, therapeutic exercise, body mechanics training and home exercise program  Frequency: 2x week  Duration in visits: 12  Duration in weeks: 6  Plan of Care beginning date: 2/17/2021  Plan of Care expiration date: 4/2/2021  Treatment plan discussed with: patient        Subjective Evaluation    History of Present Illness  Mechanism of injury: 2 5 weeks ago (1/30/21) Pt fell down icy steps and landed on his back  Confirms hitting his head but denies LOC  Overall pain is improved slightly since onset  Lumbar imaging 2/3/21:" Unremarkable appearance of posterior fusion L4-5  Moderate dextroscoliosis with advanced multilevel degenerative changes "    Pain is located lateral low back bilaterally (R>L)  At rest, pain is 2/10  He is unable to lie down to sleep, he currently sleeps in a recliner  Getting OOB is excruciating (20 out of 10), he needs to crawl and gets assistance from his wife  Eased with medication, heat  He denies LE N/T but has numbness in his 5th fingers while sitting  Denies saddle anesthesia, bowel/bladder changes, LE weakness  Pt lives at home with his wife in a multilevel house  Navigating stairs, walking, sit to stand are nonpainful  Pt works full time in building maintenance  H/o L4-5 lumbar fusion 2016, Bilateral TKA 2014  Patient Goals  Patient goals for therapy: decreased pain, increased motion, return to work, return to sport/leisure activities and increased strength          Objective     Postural Observations    Additional Postural Observation Details  Slight fwd trunk lean in standing and sitting secondary to pain  Prefers flexion  Tenderness     Additional Tenderness Details  TTP with increased mm tone paraspinals T11-L3 (R>L)  (-) slump test  (-) SLR test    Trunk flexion preference  Severe pain with supine to seated transfer; pt required moderate assist  Examination limited secondary to pt unable to lie in sidelying or prone      Neurological Testing     Sensation     Lumbar   Left   Intact: light touch    Right   Intact: light touch    Active Range of Motion   Cervical/Thoracic Spine       Thoracic    Flexion:  WFL  Extension:  WFL  Left lateral flexion:  WFL  Right lateral flexion:  WFL  Left rotation:  WFL  Right rotation:  WFL    Lumbar   Flexion:  WFL  Extension:  with pain Restriction level: minimal  Left lateral flexion:  with pain Restriction level: moderate  Right lateral flexion:  with pain Restriction level: minimal  Left rotation:  WFL  Right rotation:  [facility]    Strength/Myotome Testing     Left Hip   Planes of Motion   Flexion: 5  Abduction: 5  Adduction: 5  External rotation: 5  Internal rotation: 5    Right Hip   Planes of Motion   Flexion: 5  Abduction: 5  Adduction: 5  External rotation: 5  Internal rotation: 5    Left Knee   Flexion: 5  Extension: 5    Right Knee   Flexion: 5  Extension: 5    Left Ankle/Foot   Dorsiflexion: 5    Right Ankle/Foot   Dorsiflexion: 5    Functional Assessment        Comments  Gait: antalgic, decreased gait speed, fwd trunk lean    Sit to stand: painful, increased time to perform    Squat: WFL, painful    Heel walk: NL  Toe walk: NL      Flowsheet Rows      Most Recent Value   PT/OT G-Codes   Current Score  61   Projected Score  75             Precautions: h/o L4-5 lumbar fusion 2016, B TKA 2014; pt has severe pain with lying supine, supine to seated transition    HEP:  Access Code: [email or web address]   URL: [email or web address]   Date: 02/17/2021   Prepared by: Shannon Feliciano     Exercises  Seated Lumbar Flexion Stretch - 15 reps - 5 Hold  Seated March - 15 reps       2/17            Manuals             Lumbar paraspinal STM  In S/L                                                  Neuro Re-Ed             TA activation seated                                                                                           Ther Ex             bike             Lumbar flex 3-way tball stretch 5"x10 ea            march seated x15 ea                                                                                          Ther Activity                                       Gait Training                                       Modalities             TENS+MHP 12'

## 2021-02-17 NOTE — LETTER
2021    Diana Morales, [address] [facility] [address] [address]    Patient: Alfredo Gray   YOB: 1955   Date of Visit: 2021     Encounter Diagnosis     ICD-10-CM    1  Acute bilateral low back pain without sciatica  M54 5    2  Lumbar pain  M54 5        Dear Dr Mary Ann Poon: Thank you for your recent referral of Alfredo Gray  Please review the attached evaluation summary from Rafael's recent visit  Please verify that you agree with the plan of care by signing the attached order  If you have any questions or concerns, please do not hesitate to call  I sincerely appreciate the opportunity to share in the care of one of your patients and hope to have another opportunity to work with you in the near future  Sincerely,    Kenroy Rivers, PT      Referring Provider:      I certify that I have read the below Plan of Care and certify the need for these services furnished under this plan of treatment while under my care  Diana Morales MD  [address] [place] [address] [address] [facility] [address]  Via Fax: [phone]          PT Evaluation     Today's date: 2021  Patient name: Alfredo Gray  : 1955  MRN: [id number]  Referring provider: Cindy Wynne MD  Dx:   Encounter Diagnosis     ICD-10-CM    1  Acute bilateral low back pain without sciatica  M54 5    2   Lumbar pain  M54 5        Start Time: 0825          Assessment  Assessment details: Alfredo Gray is a 76 y o  male presenting to outpatient physical therapy at [facility] with complaints of low back pain   They present with decreased range of motion, decreased strength, limited flexibility, poor postural awareness, poor body mechanics, poor balance, decreased tolerance to activity and decreased functional mobility  Lanora Hatchet would benefit from skilled physical therapy to address noted impairments in order to allow for full functional return to work and ADL-related activities  Thank you for the referral!  Impairments: abnormal muscle firing, abnormal or restricted ROM, activity intolerance, impaired balance, impaired physical strength, lacks appropriate home exercise program, pain with function and poor body mechanics  Barriers to therapy: n/a  Understanding of Dx/Px/POC: excellent  Goals  ST   Independent with HEP in 2 weeks  2  Decrease pain by 50% in 3 wks  3   Increase Lumbar ROM to [facility] all planes and pain free in 3 weeks     LT  Achieve FOTO score of 75/100 in 6 weeks   2   Able to perform supine to seated transfer with no assist, nonpainful in 6 weeks  3  Able to sleep in his bed for a full night in 6 weeks  Plan  Plan details: RE in 6 weeks  Patient would benefit from: skilled physical therapy  Planned modality interventions: cryotherapy, electrical stimulation/Russian stimulation and thermotherapy: hydrocollator packs  Planned therapy interventions: abdominal trunk stabilization, manual therapy, neuromuscular re-education, therapeutic activities, therapeutic exercise, body mechanics training and home exercise program  Frequency: 2x week  Duration in visits: 12  Duration in weeks: 6  Plan of Care beginning date: 2021  Plan of Care expiration date: 2021  Treatment plan discussed with: patient        Subjective Evaluation    History of Present Illness  Mechanism of injury: 2 5 weeks ago (21) Pt fell down icy steps and landed on his back  Confirms hitting his head but denies LOC  Overall pain is improved slightly since onset  Lumbar imaging 2/3/21:" Unremarkable appearance of posterior fusion L4-5  Moderate dextroscoliosis with advanced multilevel degenerative changes "    Pain is located lateral low back bilaterally (R>L)  At rest, pain is 2/10  He is unable to lie down to sleep, he currently sleeps in a recliner  Getting OOB is excruciating (20 out of 10), he needs to crawl and gets assistance from his wife  Eased with medication, heat       He denies LE N/T but has numbness in his 5th fingers while sitting  Denies saddle anesthesia, bowel/bladder changes, LE weakness  Pt lives at home with his wife in a multilevel house  Navigating stairs, walking, sit to stand are nonpainful  Pt works full time in building maintenance  H/o L4-5 lumbar fusion 2016, Bilateral TKA 2014  Patient Goals  Patient goals for therapy: decreased pain, increased motion, return to work, return to sport/leisure activities and increased strength          Objective     Postural Observations    Additional Postural Observation Details  Slight fwd trunk lean in standing and sitting secondary to pain  Prefers flexion  Tenderness     Additional Tenderness Details  TTP with increased mm tone paraspinals T11-L3 (R>L)  (-) slump test  (-) SLR test    Trunk flexion preference  Severe pain with supine to seated transfer; pt required moderate assist  Examination limited secondary to pt unable to lie in sidelying or prone      Neurological Testing     Sensation     Lumbar   Left   Intact: light touch    Right   Intact: light touch    Active Range of Motion   Cervical/Thoracic Spine       Thoracic    Flexion:  WFL  Extension:  WFL  Left lateral flexion:  WFL  Right lateral flexion:  WFL  Left rotation:  WFL  Right rotation:  WFL    Lumbar   Flexion:  WFL  Extension:  with pain Restriction level: minimal  Left lateral flexion:  with pain Restriction level: moderate  Right lateral flexion:  with pain Restriction level: minimal  Left rotation:  WFL  Right rotation:  [facility]    Strength/Myotome Testing     Left Hip   Planes of Motion   Flexion: 5  Abduction: 5  Adduction: 5  External rotation: 5  Internal rotation: 5    Right Hip   Planes of Motion   Flexion: 5  Abduction: 5  Adduction: 5  External rotation: 5  Internal rotation: 5    Left Knee   Flexion: 5  Extension: 5    Right Knee   Flexion: 5  Extension: 5    Left Ankle/Foot   Dorsiflexion: 5    Right Ankle/Foot   Dorsiflexion: 5    Functional Assessment        Comments  Gait: antalgic, decreased gait speed, fwd trunk lean    Sit to stand: painful, increased time to perform    Squat: WFL, painful    Heel walk: NL  Toe walk: NL      Flowsheet Rows      Most Recent Value   PT/OT G-Codes   Current Score  61   Projected Score  75             Precautions: h/o L4-5 lumbar fusion 2016, B TKA 2014; pt has severe pain with lying supine, supine to seated transition    HEP:  Access Code: [email or web address]   URL: [email or web address]   Date: 02/17/2021   Prepared by: Aidan Aland     Exercises  Seated Lumbar Flexion Stretch - 15 reps - 5 Hold  Seated March - 15 reps       2/17            Manuals             Lumbar paraspinal STM  In S/L                                                  Neuro Re-Ed             TA activation seated                                                                                           Ther Ex             bike             Lumbar flex 3-way tball stretch 5"x10 ea            march seated x15 ea                                                                                          Ther Activity                                       Gait Training                                       Modalities             TENS+MHP 12'

## 2021-02-22 ENCOUNTER — OFFICE VISIT (OUTPATIENT)
Dept: PHYSICAL THERAPY | Facility: CLINIC | Age: 66
End: 2021-02-22
Payer: COMMERCIAL

## 2021-02-22 DIAGNOSIS — M54.50 ACUTE BILATERAL LOW BACK PAIN WITHOUT SCIATICA: ICD-10-CM

## 2021-02-22 DIAGNOSIS — M54.50 LUMBAR PAIN: Primary | ICD-10-CM

## 2021-02-22 PROCEDURE — 97110 THERAPEUTIC EXERCISES: CPT | Performed by: PHYSICAL THERAPIST

## 2021-02-22 PROCEDURE — 97112 NEUROMUSCULAR REEDUCATION: CPT | Performed by: PHYSICAL THERAPIST

## 2021-02-22 NOTE — PROGRESS NOTES
Daily Note     Today's date: 2021  Patient name: Riana Valadez  : 1955  MRN: [id number]  Referring provider: Hector Valerio MD  Dx:   Encounter Diagnosis     ICD-10-CM    1  Lumbar pain  M54 5    2  Acute bilateral low back pain without sciatica  M54 5        Start Time: 915          Subjective: Feeling a little bit better  Liked the TENS and hot pack in last session and bought a TENS unit for home use  Has been doing the stretches at home and they've been helping  Last night was the first night he's slept in his bed in a week  Sore getting out of bed but not as bad has it was during the IE  Objective: See treatment diary below      Assessment: Pt walking with greater ease today  Tolerated treatment well  Good tolerance to activity today  TA activation is weak though he was able to execute short holds with tactile cues  No pain noted throughout session  Again ended with TENS+MHP with favorable response  Continue nv and increase complexity of core stability as able  Patient demonstrated fatigue post treatment and would benefit from continued PT      Plan: Continue per plan of care  Precautions: h/o L4-5 lumbar fusion , B TKA ; pt has severe pain with lying supine, supine to seated transition    HEP:  Access Code: [email or web address]   URL: [email or web address]   Date: 2021   Prepared by: Emilia Sylvester     Exercises  Seated Lumbar Flexion Stretch - 15 reps - 5 Hold  Seated March - 15 reps                  Manuals             Lumbar paraspinal STM (in S/L)                                                    Neuro Re-Ed             TA activation seated  5"x20           TA+march  seated x20           TA+SLR  seated 2x10 ea           tball crunch iso  Stand 5"x20                                                  Ther Ex             bike  7'           Lumbar flex 3-way tball stretch 5"x10 ea 5"x10 ea           march seated x15 ea            Sit to stand  Chair +airex x20                                                                            Ther Activity                                       Gait Training                                       Modalities             TENS+MHP 12' 10'

## 2021-02-25 ENCOUNTER — APPOINTMENT (OUTPATIENT)
Dept: PHYSICAL THERAPY | Facility: CLINIC | Age: 66
End: 2021-02-25
Payer: COMMERCIAL

## 2021-02-26 ENCOUNTER — OFFICE VISIT (OUTPATIENT)
Dept: PHYSICAL THERAPY | Facility: CLINIC | Age: 66
End: 2021-02-26
Payer: COMMERCIAL

## 2021-02-26 DIAGNOSIS — M54.50 LUMBAR PAIN: Primary | ICD-10-CM

## 2021-02-26 DIAGNOSIS — M54.50 ACUTE BILATERAL LOW BACK PAIN WITHOUT SCIATICA: ICD-10-CM

## 2021-02-26 PROCEDURE — 97112 NEUROMUSCULAR REEDUCATION: CPT | Performed by: PHYSICAL THERAPIST

## 2021-02-26 PROCEDURE — 97110 THERAPEUTIC EXERCISES: CPT | Performed by: PHYSICAL THERAPIST

## 2021-02-26 NOTE — PROGRESS NOTES
Daily Note     Today's date: 2021  Patient name: Nixon Abrams  : 1955  MRN: [id number]  Referring provider: Junior Mccauley MD  Dx:   Encounter Diagnosis     ICD-10-CM    1  Lumbar pain  M54 5    2  Acute bilateral low back pain without sciatica  M54 5        Start Time: 915          Subjective: Pain was a little more frequent/intense following previous session  He was doing more desk work tomorrow and less activity  Had pain in the low back and neck all night, was unable to sleep  Took a muscle relaxer and ibuprofen this AM and feels much better  Objective: See treatment diary below      Assessment:  Tolerated treatment well  Held on sit to stands today in effort to avoid exacerbating low back pain and biked for longer for cardiovascular exercise  Continues to have severe difficulty and pain with lying supine  Core stability exercises performed in sitting again today  Added self-UT stretch for cervical relief  He has the most pain when he is sitting with his back fully vertical or sitting/lying down with his back supported  The most comfortable position is while his trunk is flexed while seated or with standing  Following TA activation in sitting exercise, he noted feeling as if he couldn't move his trunk or legs  He was able to rise from the seat on his own though did demonstrate difficulty  Stiffness eased within a few steps of walking  Ended with STM in R S/L to L lumbar paraspinals, QL, obliques with fair tolerance and MHP+TENS with very good response  Reported he "felt like new" after modality  Continue nv as able  Patient demonstrated fatigue post treatment and would benefit from continued PT      Plan: Continue per plan of care  Precautions: h/o L4-5 lumbar fusion , B TKA ; pt has severe pain with lying supine, supine to seated transition    HEP:  Access Code: [email or web address]   URL: [email or web address]   Date: 2021   Prepared by: Juanita Corona Exercises  Seated Lumbar Flexion Stretch - 15 reps - 5 Hold  Seated March - 15 reps       2/17 2/22 2/26          Manuals             L Lumbar paraspinal, obliques STM (in S/L)   [facility]             8'                                    Neuro Re-Ed             TA activation seated  5"x20 5"x20          TA+march  seated x20 seated x20          TA+SLR  seated 2x10 ea           tball crunch iso  Stand 5"x20 Stand 5"x20                                                 Ther Ex             bike  7' 10'          Lumbar flex 3-way tball stretch 5"x10 ea 5"x10 ea 5"x15          UT stretch   30"x2 ea          march seated x15 ea            Sit to stand  Chair +airex x20                                                                            Ther Activity                                       Gait Training                                       Modalities             TENS+MHP 12' 10' 12'

## 2021-03-01 ENCOUNTER — OFFICE VISIT (OUTPATIENT)
Dept: PHYSICAL THERAPY | Facility: CLINIC | Age: 66
End: 2021-03-01
Payer: COMMERCIAL

## 2021-03-01 DIAGNOSIS — M54.50 ACUTE BILATERAL LOW BACK PAIN WITHOUT SCIATICA: ICD-10-CM

## 2021-03-01 DIAGNOSIS — M54.50 LUMBAR PAIN: Primary | ICD-10-CM

## 2021-03-01 PROCEDURE — 97140 MANUAL THERAPY 1/> REGIONS: CPT | Performed by: PHYSICAL THERAPIST

## 2021-03-01 PROCEDURE — 97110 THERAPEUTIC EXERCISES: CPT | Performed by: PHYSICAL THERAPIST

## 2021-03-01 PROCEDURE — 97112 NEUROMUSCULAR REEDUCATION: CPT | Performed by: PHYSICAL THERAPIST

## 2021-03-01 NOTE — PROGRESS NOTES
Daily Note     Today's date: 3/1/2021  Patient name: Nixon Abrams  : 1955  MRN: [id number]  Referring provider: Junior Mccauley MD  Dx:   Encounter Diagnosis     ICD-10-CM    1  Lumbar pain  M54 5    2  Acute bilateral low back pain without sciatica  M54 5        Start Time: 914          Subjective: Felt a little better after the soft tissue work done last session  Overall his sx have not changed much  He continues to have the worst pain getting out of bed; reports he dreads getting out of bed in the morning  Objective: See treatment diary below      Assessment:  Tolerated treatment well  Continued with lumbar flexibility, soft tissue work and strengthening today  No exacerbation in sx, though no real change in sx  His presentation of sx leads to believe the origin is primarily muscular  Depending on response from this session, may consider progressing TE to standing therex with emphasis on lumbar flexion  He continues to favor heat+TENS end of session  Patient demonstrated fatigue post treatment and would benefit from continued PT      Plan: Continue per plan of care  Precautions: h/o L4-5 lumbar fusion , B TKA ; pt has severe pain with lying supine, supine to seated transition    HEP:  Access Code: [email or web address]   URL: [email or web address]   Date: 2021   Prepared by: Juanita Corona     Exercises  Seated Lumbar Flexion Stretch - 15 reps - 5 Hold  Seated March - 15 reps       2/17 2/22 2/26 3/1         Manuals             L Lumbar paraspinal, obliques STM (in R S/L)   [facility] [address]         Assisted lumbar flexion stretch    [facility]            8' 10'                                   Neuro Re-Ed             TA activation  seated 5"x20 seated 5"x20          TA+march  seated x20 seated x20 [id number]          TA+SLR  seated 2x10 ea  seated x20         tball crunch iso  Stand 5"x20 Stand 5"x20                                                 Ther Ex             bike  7' 10' 10' Lumbar flex 3-way tball stretch 5"x10 ea 5"x10 ea 5"x15 x10 ea         UT stretch   30"x2 ea          HS stretch    seated 30"x2 ea         march seated x15 ea   seated x20 ea         Sit to stand  Chair +airex x20           Hip add    Ball iso 10"x10         Hip abd    Fit circ iso 10"x10                                                Ther Activity                                       Gait Training                                       Modalities             TENS+MHP 12' 10' 12' 12'

## 2021-03-04 ENCOUNTER — OFFICE VISIT (OUTPATIENT)
Dept: PHYSICAL THERAPY | Facility: CLINIC | Age: 66
End: 2021-03-04
Payer: COMMERCIAL

## 2021-03-04 DIAGNOSIS — M54.50 LUMBAR PAIN: Primary | ICD-10-CM

## 2021-03-04 DIAGNOSIS — M54.50 ACUTE BILATERAL LOW BACK PAIN WITHOUT SCIATICA: ICD-10-CM

## 2021-03-04 PROCEDURE — 97110 THERAPEUTIC EXERCISES: CPT | Performed by: PHYSICAL THERAPIST

## 2021-03-04 PROCEDURE — 97112 NEUROMUSCULAR REEDUCATION: CPT | Performed by: PHYSICAL THERAPIST

## 2021-03-04 NOTE — PROGRESS NOTES
Daily Note     Today's date: 3/4/2021  Patient name: Jourdan Brown  : 1955  MRN: [id number]  Referring provider: Abril Ingram MD  Dx:   Encounter Diagnosis     ICD-10-CM    1  Lumbar pain  M54 5    2  Acute bilateral low back pain without sciatica  M54 5        Start Time: 915          Subjective: Bed mobility continues to be very difficult secondary to pain though it is improving slightly  Bending is very hard  Overall AM stiffness eases within a few steps walking  Objective: See treatment diary below      Assessment:  Tolerated treatment well  Trialed with more cardiovascular and posterior chain activation this session rather than just core stability  Began session with bike intervals, then performed standing hip extension (in neutral) and back extension (at ~20 deg trunk flexion) isometrics  He noted feeling the back, but it wasn't pain following exercises  Ended with assisted seated lumbar flexion 3-way stretch and TENS+MHP  Assess response nv  Patient demonstrated fatigue post treatment and would benefit from continued PT      Plan: Continue per plan of care  Precautions: h/o L4-5 lumbar fusion , B TKA ; pt has severe pain with lying supine, supine to seated transition    HEP:  Access Code: [email or web address]   URL: [email or web address]   Date: 2021   Prepared by: Bi Donnelly     Exercises  Seated Lumbar Flexion Stretch - 15 reps - 5 Hold  Seated March - 15 reps       2/17 2/22 2/26 3/1 3/3        Manuals             L Lumbar paraspinal, obliques STM (in R S/L)   [facility]         Assisted lumbar flexion 3-way stretch    [facility]EF[facility]            8' 10' 8'                                  Neuro Re-Ed             TA activation  seated 5"x20 seated 5"x20          TA+march  seated x20 seated x20 [id number]          TA+SLR  seated 2x10 ea  seated x20         tball crunch iso  Stand 5"x20 Stand 5"x20                                                 Ther Ex             bike  7' 10' 10' 5'    1' fast  1' slow x3        Lumbar flex 3-way tball stretch 5"x10 ea 5"x10 ea 5"x15 x10 ea x10 ea        UT stretch   30"x2 ea          HS stretch    seated 30"x2 ea         march seated x15 ea   seated x20 ea         Sit to stand  Chair +airex x20           Hip add    Ball iso 10"x10 Ball iso 10"x10        Hip abd    Fit circ iso 10"x10 Fit circ iso 10"x10        Hip ext     iso (at 0deg hip ext) against wall 5"x10 ea        Back ext     iso (at 20deg trunk flex) against wall+ foam roll 5"x10                                                                         Ther Activity                                       Gait Training                                       Modalities             TENS+MHP 12' 10' 12' 12' 12'

## 2021-03-08 ENCOUNTER — OFFICE VISIT (OUTPATIENT)
Dept: PHYSICAL THERAPY | Facility: CLINIC | Age: 66
End: 2021-03-08
Payer: COMMERCIAL

## 2021-03-08 DIAGNOSIS — M54.50 ACUTE BILATERAL LOW BACK PAIN WITHOUT SCIATICA: ICD-10-CM

## 2021-03-08 DIAGNOSIS — M54.50 LUMBAR PAIN: Primary | ICD-10-CM

## 2021-03-08 PROCEDURE — 97140 MANUAL THERAPY 1/> REGIONS: CPT | Performed by: PHYSICAL THERAPIST

## 2021-03-08 PROCEDURE — 97110 THERAPEUTIC EXERCISES: CPT | Performed by: PHYSICAL THERAPIST

## 2021-03-08 NOTE — PROGRESS NOTES
Daily Note     Today's date: 3/8/2021  Patient name: Alfredo Gray  : 1955  MRN: [id number]  Referring provider: Cindy Wynne MD  Dx:   Encounter Diagnosis     ICD-10-CM    1  Lumbar pain  M54 5    2  Acute bilateral low back pain without sciatica  M54 5        Start Time: 0916          Subjective: Sore after previous session but no real increase in sx  Getting out of bed is very slowly becoming easier  Objective: See treatment diary below      Assessment:  Tolerated treatment well  Performed hip add/abd iso's while in a wall sit today and continued with posterior chain isometrics  He did well with all TE and only c/o fatigue and slight knee pain with the wall sits at 90 deg, which eased with wall sit at 45 deg knee flex  Ended with TA stability and manual lumbar stretched  Held on the TENS+MHP today in effort to perform more exercise instead of modality and encouraged to use TENS+heat at home if needed  Overall good response to session  Continue with more functional exercise as able nv  Patient demonstrated fatigue post treatment and would benefit from continued PT      Plan: Continue per plan of care  Precautions: h/o L4-5 lumbar fusion , B TKA ; pt has severe pain with lying supine, supine to seated transition    HEP:  Access Code: [email or web address]   URL: [email or web address]   Date: 2021   Prepared by: Kenroy Rivers     Exercises  Seated Lumbar Flexion Stretch - 15 reps - 5 Hold  Seated March - 15 reps       2/17 2/22 2/26 3/1 3/3 3/8       Manuals             L Lumbar paraspinal, obliques STM (in R S/L)   [facility]         Assisted lumbar flexion 3-way stretch    [facility]          8' 10' 8' 8'                                 Neuro Re-Ed             TA activation  seated 5"x20 seated 5"x20          TA+march  seated x20 seated x20 [id number]   Seated x20 ea       TA+SLR  seated 2x10 ea  seated x20         tball crunch iso  Stand 5"x20 Stand 5"x20 Ther Ex             bike  7' 10' 10' 5'    1' fast  1' slow x3 5'    1' fast  1' slow x3       Lumbar flex 3-way tball stretch 5"x10 ea 5"x10 ea 5"x15 x10 ea x10 ea x10 ea       UT stretch   30"x2 ea          HS stretch    seated 30"x2 ea         march seated x15 ea   seated x20 ea         Sit to stand  Chair +airex x20           Hip add    Ball iso 10"x10 Ball iso 10"x10 Wall sit ball iso 10"x10       Hip abd    Fit circ iso 10"x10 Fit circ iso 10"x10 Wall sit fit circ iso 10"x10       Hip ext     iso (at 0deg hip ext) against wall 5"x10 ea iso (at 0deg hip ext) against wall 5"x15 ea       Back ext     iso (at 20deg trunk flex) against wall+ foam roll 5"x10 iso (at 20deg trunk flex) against wall+ foam roll 5"x15                                                                        Ther Activity                                       Gait Training                                       Modalities             TENS+MHP 12' 10' 12' 12' 12'

## 2021-03-11 ENCOUNTER — OFFICE VISIT (OUTPATIENT)
Dept: PHYSICAL THERAPY | Facility: CLINIC | Age: 66
End: 2021-03-11
Payer: COMMERCIAL

## 2021-03-11 DIAGNOSIS — M54.50 ACUTE BILATERAL LOW BACK PAIN WITHOUT SCIATICA: ICD-10-CM

## 2021-03-11 DIAGNOSIS — M54.50 LUMBAR PAIN: Primary | ICD-10-CM

## 2021-03-11 PROCEDURE — 97110 THERAPEUTIC EXERCISES: CPT | Performed by: PHYSICAL THERAPIST

## 2021-03-11 PROCEDURE — 97140 MANUAL THERAPY 1/> REGIONS: CPT | Performed by: PHYSICAL THERAPIST

## 2021-03-11 NOTE — PROGRESS NOTES
Daily Note     Today's date: 3/11/2021  Patient name: Selin Nayak  : 1955  MRN: [id number]  Referring provider: Elisha Olszewski, MD  Dx:   Encounter Diagnosis     ICD-10-CM    1  Lumbar pain  M54 5    2  Acute bilateral low back pain without sciatica  M54 5        Start Time: 915          Subjective: Sore yesterday but he thinks it's from overdoing it with activity  Overall he feels he is slowly getting better  Getting out of bed continues to be difficult but it is becoming easier each day  Objective: See treatment diary below      Assessment:  Tolerated treatment well  Progressed beyond posterior chain isometrics today and added in movement with banded side steps, hip hinge, standing hip extension and standing marches  No complaints of increased back pain, though he did note feeling leg cramping with hip extensions  Fair performance with hip hinge to knee height  He required vc's for keeping his shoulders back in order to avoid rounding the spine into flexion  Ended with manual flexion stretching  Did not perform TENS+MHP  Continue with more functional exercise as able nv  Patient demonstrated fatigue post treatment and would benefit from continued PT      Plan: Continue per plan of care  Precautions: h/o L4-5 lumbar fusion , B TKA ; pt has severe pain with lying supine, supine to seated transition    HEP:  Access Code: [email or web address]   URL: [email or web address]   Date: 2021   Prepared by: Yoko Nunn     Exercises  Seated Lumbar Flexion Stretch - 15 reps - 5 Hold  Seated March - 15 reps       2/17 2/22 2/26 3/1 3/3 3/8 3/11      Manuals             L Lumbar paraspinal, obliques STM (in R S/L)   [facility]         Assisted lumbar flexion 3-way stretch    [facility]         8' 10' 8' 8' 8'                                Neuro Re-Ed             TA activation  seated 5"x20 seated 5"x20          TA+march  seated x20 seated x20 [id number]   Seated x20 ea       TA+SLR  seated 2x10 ea  seated x20         tball crunch iso  Stand 5"x20 Stand 5"x20                                                 Ther Ex             bike  7' 10' 10' 5'    1' fast  1' slow x3 5'    1' fast  1' slow x3 5'    1' fast  1' slow x4      Lumbar flex 3-way tball stretch 5"x10 ea 5"x10 ea 5"x15 x10 ea x10 ea x10 ea x10 ea      UT stretch   30"x2 ea          HS stretch    seated 30"x2 ea         march seated x15 ea   seated x20 ea   Stand x20 ea      Sit to stand  Chair +airex x20           Hip add    Ball iso 10"x10 Ball iso 10"x10 Wall sit ball iso 10"x10       Hip abd    Fit circ iso 10"x10 Fit circ iso 10"x10 Wall sit fit circ iso 10"x10 Side step ytb 2x10      Hip ext     iso (at 0deg hip ext) against wall 5"x10 ea iso (at 0deg hip ext) against wall 5"x15 ea Stand AROM 2x10 ea    Foot against wall 5" x10 ea      Back ext     iso (at 20deg trunk flex) against wall+ foam roll 5"x10 iso (at 20deg trunk flex) against wall+ foam roll 5"x15 Wall +foam roll 5"x10    Hip hinge to knee 2x10                                                                       Ther Activity                                       Gait Training                                       Modalities             TENS+MHP 12' 10' 12' 12' 12'

## 2021-03-15 ENCOUNTER — OFFICE VISIT (OUTPATIENT)
Dept: PHYSICAL THERAPY | Facility: CLINIC | Age: 66
End: 2021-03-15
Payer: COMMERCIAL

## 2021-03-15 DIAGNOSIS — M54.50 LUMBAR PAIN: Primary | ICD-10-CM

## 2021-03-15 DIAGNOSIS — M54.50 ACUTE BILATERAL LOW BACK PAIN WITHOUT SCIATICA: ICD-10-CM

## 2021-03-15 PROCEDURE — 97140 MANUAL THERAPY 1/> REGIONS: CPT | Performed by: PHYSICAL THERAPIST

## 2021-03-15 PROCEDURE — 97110 THERAPEUTIC EXERCISES: CPT | Performed by: PHYSICAL THERAPIST

## 2021-03-15 NOTE — PROGRESS NOTES
Daily Note     Today's date: 3/15/2021  Patient name: Rodrigo Handley  : 1955  MRN: [id number]  Referring provider: Patrice Gilford, MD  Dx:   Encounter Diagnosis     ICD-10-CM    1  Lumbar pain  M54 5    2  Acute bilateral low back pain without sciatica  M54 5        Start Time: 915          Subjective: Doing better  Objective: See treatment diary below      Assessment:  Tolerated treatment well  Introduced unsupported hip hinge to chair, which he tolerated well so we loaded the movement with a 10# KB  He was able to perform dead lift to stool with 10# KB  Form was adequate and no increase in sx, only fatigued  Ended with assisted lumbar stretch  Continue as able nv  Patient demonstrated fatigue post treatment and would benefit from continued PT      Plan: Continue per plan of care  Precautions: h/o L4-5 lumbar fusion , B TKA ; pt has severe pain with lying supine, supine to seated transition    HEP:  Access Code: [email or web address]   URL: [email or web address]   Date: 2021   Prepared by: [place] Socks     Exercises  Seated Lumbar Flexion Stretch - 15 reps - 5 Hold  Seated March - 15 reps       2/17 2/22 2/26 3/1 3/3 3/8 3/11 3/15     Manuals             L Lumbar paraspinal, obliques STM (in R S/L)   [facility]         Assisted lumbar flexion 3-way stretch    [facility]        8' 10' 8' 8' 8' 8'                               Neuro Re-Ed             TA activation  seated 5"x20 seated 5"x20          TA+march  seated x20 seated x20 [email or web address]   Seated x20 ea       TA+SLR  seated 2x10 ea  seated x20         tball crunch iso  Stand 5"x20 Stand 5"x20                                                 Ther Ex             bike  7' 10' 10' 5'    1' fast  1' slow x3 5'    1' fast  1' slow x3 5'    1' fast  1' slow x4 5'    1' fast  1' slow x4     Lumbar flex 3-way tball stretch 5"x10 ea 5"x10 ea 5"x15 x10 ea x10 ea x10 ea x10 ea x10 ea     Lumbar flex arom        x15     UT stretch   30"x2 ea          HS stretch    seated 30"x2 ea         march seated x15 ea   seated x20 ea   Stand x20 ea Stand x30     Sit to stand  Chair +airex x20           Hip add    Ball iso 10"x10 Ball iso 10"x10 Wall sit ball iso 10"x10       Hip abd    Fit circ iso 10"x10 Fit circ iso 10"x10 Wall sit fit circ iso 10"x10 Side step ytb 2x10 Side step ytb ankle x3 laps     Hip ext     iso (at 0deg hip ext) against wall 5"x10 ea iso (at 0deg hip ext) against wall 5"x15 ea Stand AROM 2x10 ea    Foot against wall 5" x10 ea      Back ext     iso (at 20deg trunk flex) against wall+ foam roll 5"x10 iso (at 20deg trunk flex) against wall+ foam roll 5"x15 Wall +foam roll 5"x10    Hip hinge to knee 2x10 Hip hinge to knee x20, 10# KB x20     Dead lift        To chair x20  To chair +10# x15  To stool +10# x15                                                         Ther Activity                                       Gait Training                                       Modalities             TENS+MHP 12' 10' 12' 12' 12'

## 2021-03-18 ENCOUNTER — APPOINTMENT (OUTPATIENT)
Dept: PHYSICAL THERAPY | Facility: CLINIC | Age: 66
End: 2021-03-18
Payer: COMMERCIAL

## 2021-03-22 ENCOUNTER — OFFICE VISIT (OUTPATIENT)
Dept: PHYSICAL THERAPY | Facility: CLINIC | Age: 66
End: 2021-03-22
Payer: COMMERCIAL

## 2021-03-22 DIAGNOSIS — M54.50 LUMBAR PAIN: Primary | ICD-10-CM

## 2021-03-22 DIAGNOSIS — M54.50 ACUTE BILATERAL LOW BACK PAIN WITHOUT SCIATICA: ICD-10-CM

## 2021-03-22 PROCEDURE — 97112 NEUROMUSCULAR REEDUCATION: CPT | Performed by: PHYSICAL THERAPIST

## 2021-03-22 PROCEDURE — 97110 THERAPEUTIC EXERCISES: CPT | Performed by: PHYSICAL THERAPIST

## 2021-03-22 NOTE — PROGRESS NOTES
Daily Note     Today's date: 3/22/2021  Patient name: Zion Laughlin  : 1955  MRN: [id number]  Referring provider: Devin Millan MD  Dx:   Encounter Diagnosis     ICD-10-CM    1  Lumbar pain  M54 5    2  Acute bilateral low back pain without sciatica  M54 5        Start Time: 915          Subjective: Feeling about the same  It still hurts to get out of bed for 30 minutes, eases with TENS and heating pad  Objective: See treatment diary below  Supine to sit to stand bed mobility assessment: log roll to sitting with good technique; gowers sign with sit to stand due to pain      Assessment: Bed mobility with good technique  He was able to perform 2x20 sit to stand without assistance or issue, but when performing sit to stand after rolling from supine to seated, he performed gowers sign with sit to stand, suggesting low core stability  Tolerated treatment well  Progressed standing therex with good tolerance  Re-added seated TA activation exercises as well as standing anti-trunk rotation walk outs  Did not stretch end of session  Continue as able nv  Patient demonstrated fatigue post treatment and would benefit from continued PT      Plan: Continue per plan of care  Precautions: h/o L4-5 lumbar fusion , B TKA ; pt has severe pain with lying supine, supine to seated transition    HEP:  Access Code: [email or web address]   URL: [email or web address]   Date: 2021   Prepared by: Jimena Horan     Exercises  Seated Lumbar Flexion Stretch - 15 reps - 5 Hold  Seated March - 15 reps       2/17 2/22 2/26 3/1 3/3 3/8 3/11 3/15 3/22    Manuals             L Lumbar paraspinal, obliques STM (in R S/L)   [facility]         Assisted lumbar flexion 3-way stretch    [facility]        8' 10' 8' 8' 8' 8'                               Neuro Re-Ed             TA activation  seated 5"x20 seated 5"x20      Seated 5"x20    TA+march  seated x20 seated x20 [id number]   Seated x20 ea       TA+SLR  seated 2x10 ea seated x20     Seated 2x10 ea    tball crunch iso  Stand 5"x20 Stand 5"x20          Trunk anti-rot walk out         20# x10 ea                              Ther Ex             bike  7' 10' 10' 5'    1' fast  1' slow x3 5'    1' fast  1' slow x3 5'    1' fast  1' slow x4 5'    1' fast  1' slow x4 5'    1' fast  1' slow x4    Lumbar flex 3-way tball stretch 5"x10 ea 5"x10 ea 5"x15 x10 ea x10 ea x10 ea x10 ea x10 ea     Lumbar flex arom        x15     UT stretch   30"x2 ea          HS stretch    seated 30"x2 ea         march seated x15 ea   seated x20 ea   Stand x20 ea Stand x30 Stand x30    Sit to stand  Chair +airex x20       2x20    Hip add    Ball iso 10"x10 Ball iso 10"x10 Wall sit ball iso 10"x10       Hip abd    Fit circ iso 10"x10 Fit circ iso 10"x10 Wall sit fit circ iso 10"x10 Side step ytb 2x10 Side step ytb ankle x3 laps Side step ytb ankle x3 laps    Hip ext     iso (at 0deg hip ext) against wall 5"x10 ea iso (at 0deg hip ext) against wall 5"x15 ea Stand AROM 2x10 ea    Foot against wall 5" x10 ea      Back ext     iso (at 20deg trunk flex) against wall+ foam roll 5"x10 iso (at 20deg trunk flex) against wall+ foam roll 5"x15 Wall +foam roll 5"x10    Hip hinge to knee 2x10 Hip hinge to knee x20, 10# KB x20 Hip hinge to knee x20 (Deadlift warm up)    Dead lift        To chair x20  To chair +10# x15  To stool +10# x15 To stool 10# x20  15# 2x20                                                        Ther Activity                                       Gait Training                                       Modalities             TENS+MHP 12' 10' 12' 12' 12'

## 2021-03-25 ENCOUNTER — OFFICE VISIT (OUTPATIENT)
Dept: PHYSICAL THERAPY | Facility: CLINIC | Age: 66
End: 2021-03-25
Payer: COMMERCIAL

## 2021-03-25 DIAGNOSIS — M54.50 ACUTE BILATERAL LOW BACK PAIN WITHOUT SCIATICA: ICD-10-CM

## 2021-03-25 DIAGNOSIS — M54.50 LUMBAR PAIN: Primary | ICD-10-CM

## 2021-03-25 PROCEDURE — 97112 NEUROMUSCULAR REEDUCATION: CPT | Performed by: PHYSICAL THERAPIST

## 2021-03-25 PROCEDURE — 97110 THERAPEUTIC EXERCISES: CPT | Performed by: PHYSICAL THERAPIST

## 2021-03-25 NOTE — PROGRESS NOTES
Daily Note     Today's date: 3/25/2021  Patient name: Jourdan Brown  : 1955  MRN: [id number]  Referring provider: Abril Ingram MD  Dx:   Encounter Diagnosis     ICD-10-CM    1  Lumbar pain  M54 5    2  Acute bilateral low back pain without sciatica  M54 5        Start Time: 0915          Subjective: More sore after previous session, needed tylenol and muscle relaxer for relief  Objective: See treatment diary below  Supine to sit to stand bed mobility assessment: log roll to sitting with good technique; gowers sign with sit to stand due to pain      Assessment:  Tolerated treatment well  Consider soreness after last session to be secondary to increased repetitions of dead lifts  Performed fewer reps today with the theraband instead of the KB, which he tolerated well  Sore end of session, but not bad  Continue as able nv  Patient demonstrated fatigue post treatment and would benefit from continued PT      Plan: Continue per plan of care  Precautions: h/o L4-5 lumbar fusion , B TKA ; pt has severe pain with lying supine, supine to seated transition    HEP:  Access Code: [email or web address]   URL: [email or web address]   Date: 2021   Prepared by: Bi Donnelly     Exercises  Seated Lumbar Flexion Stretch - 15 reps - 5 Hold  Seated March - 15 reps       2/17 2/22 2/26 3/1 3/3 3/8 3/11 3/15 3/22 3/25   Manuals             L Lumbar paraspinal, obliques STM (in R S/L)   [facility]         Assisted lumbar flexion 3-way stretch    [facility]        8' 10' 8' 8' 8' 8'                               Neuro Re-Ed             TA activation  seated 5"x20 seated 5"x20      Seated 5"x20 Seated 5"x20   TA+march  seated x20 seated x20 [email or web address]   Seated x20 ea    Seated 2x10 ea   TA+SLR  seated 2x10 ea  seated x20     Seated 2x10 ea Seated 5"x10 ea   tball crunch iso  Stand 5"x20 Stand 5"x20          Trunk anti-rot walk out         20# x10 ea 20# x15 ea                             Ther Ex bike  7' 10' 10' 5'    1' fast  1' slow x3 5'    1' fast  1' slow x3 5'    1' fast  1' slow x4 5'    1' fast  1' slow x4 5'    1' fast  1' slow x4 4'    1' fast  1' slow x5   Lumbar flex 3-way tball stretch 5"x10 ea 5"x10 ea 5"x15 x10 ea x10 ea x10 ea x10 ea x10 ea     Lumbar flex arom        x15     UT stretch   30"x2 ea          HS stretch    seated 30"x2 ea         march seated x15 ea   seated x20 ea   Stand x20 ea Stand x30 Stand x30 Stand +tball crunch x30   Sit to stand  Chair +airex x20       2x20 2x20   Hip add    Ball iso 10"x10 Ball iso 10"x10 Wall sit ball iso 10"x10       Hip abd    Fit circ iso 10"x10 Fit circ iso 10"x10 Wall sit fit circ iso 10"x10 Side step ytb 2x10 Side step ytb ankle x3 laps Side step ytb ankle x3 laps    Hip ext     iso (at 0deg hip ext) against wall 5"x10 ea iso (at 0deg hip ext) against wall 5"x15 ea Stand AROM 2x10 ea    Foot against wall 5" x10 ea      Back ext     iso (at 20deg trunk flex) against wall+ foam roll 5"x10 iso (at 20deg trunk flex) against wall+ foam roll 5"x15 Wall +foam roll 5"x10    Hip hinge to knee 2x10 Hip hinge to knee x20, 10# KB x20 Hip hinge to knee x20 (Deadlift warm up)    Dead lift        To chair x20  To chair +10# x15  To stool +10# x15 To stool 10# x20  15# 2x20 rtb x30                                                       Ther Activity                                       Gait Training                                       Modalities             TENS+MHP 12' 10' 12' 12' 12'

## 2021-03-29 ENCOUNTER — OFFICE VISIT (OUTPATIENT)
Dept: PHYSICAL THERAPY | Facility: CLINIC | Age: 66
End: 2021-03-29
Payer: COMMERCIAL

## 2021-03-29 DIAGNOSIS — M54.50 LUMBAR PAIN: Primary | ICD-10-CM

## 2021-03-29 DIAGNOSIS — M54.50 ACUTE BILATERAL LOW BACK PAIN WITHOUT SCIATICA: ICD-10-CM

## 2021-03-29 PROCEDURE — 97110 THERAPEUTIC EXERCISES: CPT | Performed by: PHYSICAL THERAPIST

## 2021-03-29 PROCEDURE — 97112 NEUROMUSCULAR REEDUCATION: CPT | Performed by: PHYSICAL THERAPIST

## 2021-03-29 NOTE — PROGRESS NOTES
Daily Note     Today's date: 3/29/2021  Patient name: Taryn Bates  : 1955  MRN: [id number]  Referring provider: Ashley Awad MD  Dx:   Encounter Diagnosis     ICD-10-CM    1  Lumbar pain  M54 5    2  Acute bilateral low back pain without sciatica  M54 5        Start Time: 915          Subjective: Feeling the same  Objective: See treatment diary below  Supine to sit to stand bed mobility assessment: log roll to sitting with good technique; gowers sign with sit to stand due to pain      Assessment:  Tolerated treatment well  More emphasis on controlled trunk rotation and stability today  Good tolerance to session, though fatigued end of session  Assess response nv  Patient demonstrated fatigue post treatment and would benefit from continued PT      Plan: Continue per plan of care  RE nv with plan for d/c  Precautions: h/o L4-5 lumbar fusion , B TKA ; pt has severe pain with lying supine, supine to seated transition    HEP:  Access Code: [email or web address]   URL: [email or web address]   Date: 2021   Prepared by: Linette Mojica     Exercises  Seated Lumbar Flexion Stretch - 15 reps - 5 Hold  Seated March - 15 reps       3/29        3/22 3/25   Manuals             Assisted lumbar flexion 3-way stretch                                                    Neuro Re-Ed             TA activation Seated tball 10"x10        Seated 5"x20 Seated 5"x20   TA+march Seated tball 2x20 ea         Seated 2x10 ea   TA+SLR Seated tball 5"x10 ea        Seated 2x10 ea Seated 5"x10 ea   tball crunch iso Seated +rot x15 ea            Trunk anti-rot walk out 20# x10 ea        20# x10 ea 20# x15 ea   Trunk cable rotation 15# 2x10 ea                         Ther Ex             bike 4'    1' fast  1' slow x5        5'    1' fast  1' slow x4 4'    1' fast  1' slow x5   Lumbar flex 3-way tball stretch             Lumbar flex arom             march         Stand x30 Stand +tball crunch x30   Sit to stand 2x20 2x20   Hip abd         Side step ytb ankle x3 laps    Back ext         Hip hinge to knee x20 (Deadlift warm up)    Dead lift rtb x30        To stool 10# x20  15# 2x20 rtb x30                                                       Ther Activity                                       Gait Training                                       Modalities             TENS+MHP

## 2021-04-01 ENCOUNTER — EVALUATION (OUTPATIENT)
Dept: PHYSICAL THERAPY | Facility: CLINIC | Age: 66
End: 2021-04-01
Payer: COMMERCIAL

## 2021-04-01 DIAGNOSIS — M54.50 ACUTE BILATERAL LOW BACK PAIN WITHOUT SCIATICA: ICD-10-CM

## 2021-04-01 DIAGNOSIS — M54.50 LUMBAR PAIN: Primary | ICD-10-CM

## 2021-04-01 PROCEDURE — 97140 MANUAL THERAPY 1/> REGIONS: CPT | Performed by: PHYSICAL THERAPIST

## 2021-04-01 PROCEDURE — 97112 NEUROMUSCULAR REEDUCATION: CPT | Performed by: PHYSICAL THERAPIST

## 2021-04-01 NOTE — PROGRESS NOTES
PT Re-Evaluation  and PT Discharge    Today's date: 2021  Patient name: Holger Vigil  : 1955  MRN: [id number]  Referring provider: Denzel Medrano MD  Dx:   Encounter Diagnosis     ICD-10-CM    1  Lumbar pain  M54 5    2  Acute bilateral low back pain without sciatica  M54 5                   Assessment  Assessment details: Holger Vigil is a 76 y o  male presenting to outpatient physical therapy at [place] [address] with complaints of low back pain   They present with decreased range of motion, decreased strength, limited flexibility, poor postural awareness, poor body mechanics, poor balance, decreased tolerance to activity and decreased functional mobility  Jayro Francisco would benefit from skilled physical therapy to address noted impairments in order to allow for full functional return to work and ADL-related activities  Thank you for the referral!    RE: 21  Holger Vigil has attended physical therapy for 12 visits  In this time, they have NOT made significant improvements in symptoms and function, as noted by subjective report, improved balance, ROM, strength, flexibility and activity tolerance  They have NOT made positive goal progress with FOTO score DECREASE from 61 at initial evaluation to 47 currently  Recommend d/c to HEP at this time with f/u with referring physician  Impairments: abnormal muscle firing, abnormal or restricted ROM, activity intolerance, impaired balance, impaired physical strength, lacks appropriate home exercise program, pain with function and poor body mechanics  Barriers to therapy: n/a  Understanding of Dx/Px/POC: excellent  Goals  ST   Independent with HEP in 2 weeks - met  2  Decrease pain by 50% in 3 wks - not met  3   Increase Lumbar ROM to [facility] all planes and pain free in 3 weeks - not met    LT  Achieve FOTO score of 75/100 in 6 weeks - not met (54/100)  2   Able to perform supine to seated transfer with no assist, nonpainful in 6 weeks - not met  3    Able to sleep in his bed for a full night in 6 weeks  - met      Plan  Plan details: D/c to HEP  Patient would benefit from: skilled physical therapy  Planned modality interventions: cryotherapy, electrical stimulation/Russian stimulation and thermotherapy: hydrocollator packs  Planned therapy interventions: abdominal trunk stabilization, manual therapy, neuromuscular re-education, therapeutic activities, therapeutic exercise, body mechanics training and home exercise program  Treatment plan discussed with: patient        Subjective Evaluation    History of Present Illness  Mechanism of injury: 2 5 weeks ago (1/30/21) Pt fell down icy steps and landed on his back  Confirms hitting his head but denies LOC  Overall pain is improved slightly since onset  Lumbar imaging 2/3/21:" Unremarkable appearance of posterior fusion L4-5  Moderate dextroscoliosis with advanced multilevel degenerative changes "    Pain is located lateral low back bilaterally (R>L)  At rest, pain is 2/10  He is unable to lie down to sleep, he currently sleeps in a recliner  Getting OOB is excruciating (20 out of 10), he needs to crawl and gets assistance from his wife  Eased with medication, heat  He denies LE N/T but has numbness in his 5th fingers while sitting  Denies saddle anesthesia, bowel/bladder changes, LE weakness  Pt lives at home with his wife in a multilevel house  Navigating stairs, walking, sit to stand are nonpainful  Pt works full time in building maintenance  H/o L4-5 lumbar fusion 2016, Bilateral TKA 2014  RE: 4/1/21  Pt reports he's been feeling a little better since beginning therapy but honestly not by much  His pain continues to be moderate to severe with lying in bed and bed mobility, followed by soreness in the morning which eases within a few hours with medication and walking around  He has been able to return to sleeping in his bed and able to get out of bed without assistance though pain is a 9/10   He is able to walk and bend over without issues, but avoids lifting heavy in fear of injuring/aggravating the back  At rest t/o the day, pain is 0/10  Denies LE radiation, N/T, bowel/bladder changes, LE weakness  Due to lack of benefits from therapy, he would like to d/c today and plans to f/u with his doctor  Patient Goals  Patient goals for therapy: decreased pain, increased motion, return to work, return to sport/leisure activities and increased strength          Objective     Postural Observations    Additional Postural Observation Details  Slight fwd trunk lean in standing and sitting secondary to pain  Prefers flexion  Tenderness     Additional Tenderness Details  TTP with increased mm tone paraspinals T11-L3 (R>L)  (-) slump test  (-) SLR test    Trunk flexion preference  Moderate pain with supine to seated transfer; no assist required  Jaja's sign from sit to stand after supine to sit secondary to pain      Neurological Testing     Sensation     Lumbar   Left   Intact: light touch    Right   Intact: light touch    Active Range of Motion   Cervical/Thoracic Spine       Thoracic    Flexion:  WFL  Extension:  WFL  Left lateral flexion:  WFL  Right lateral flexion:  WFL  Left rotation:  WFL  Right rotation:  WFL    Lumbar   Flexion:  WFL  Extension:  with pain Restriction level: minimal  Left lateral flexion:  with pain Restriction level: moderate  Right lateral flexion:  with pain Restriction level: minimal  Left rotation:  WFL  Right rotation:  [facility]    Strength/Myotome Testing     Left Hip   Planes of Motion   Flexion: 5  Abduction: 5  Adduction: 5  External rotation: 5  Internal rotation: 5    Right Hip   Planes of Motion   Flexion: 5  Abduction: 5  Adduction: 5  External rotation: 5  Internal rotation: 5    Left Knee   Flexion: 5  Extension: 5    Right Knee   Flexion: 5  Extension: 5    Left Ankle/Foot   Dorsiflexion: 5    Right Ankle/Foot   Dorsiflexion: 5    Additional Strength Details  TA activation fair  Functional Assessment        Comments  Gait: antalgic, decreased gait speed, fwd trunk lean    Sit to stand: painful, increased time to perform    Squat: WFL, painful    Heel walk: NL  Toe walk: NL             Precautions: h/o L4-5 lumbar fusion 2016, B TKA 2014; pt has severe pain with lying supine, supine to seated transition    HEP:  Access Code: [email or web address]   URL: [email or web address]   Date: 02/17/2021   Prepared by: Tamie Fields     Exercises  Seated Lumbar Flexion Stretch - 15 reps - 5 Hold  Seated March - 15 reps       3/29 4/1       3/22 3/25   Manuals             Assisted lumbar flexion 3-way stretch             RE  ESTEFANI             30'                        Neuro Re-Ed             TA activation Seated tball 10"x10        Seated 5"x20 Seated 5"x20   TA+march Seated tball 2x20 ea         Seated 2x10 ea   TA+SLR Seated tball 5"x10 ea        Seated 2x10 ea Seated 5"x10 ea   tball crunch iso Seated +rot x15 ea            Trunk anti-rot walk out 20# x10 ea 25# 2x15 ea       20# x10 ea 20# x15 ea   Trunk cable rotation 15# 2x10 ea                         Ther Ex             bike 4'    1' fast  1' slow x5 4'    1' fast  1' slow x5       5'    1' fast  1' slow x4 4'    1' fast  1' slow x5   Lumbar flex 3-way tball stretch             Lumbar flex arom             march         Stand x30 Stand +tball crunch x30   Sit to stand         2x20 2x20   Hip abd         Side step ytb ankle x3 laps    Back ext         Hip hinge to knee x20 (Deadlift warm up)    Dead lift rtb x30        To stool 10# x20  15# 2x20 rtb x30                                                       Ther Activity                                       Gait Training                                       Modalities             TENS+MHP

## 2021-11-03 ENCOUNTER — APPOINTMENT (OUTPATIENT)
Dept: RADIOLOGY | Facility: CLINIC | Age: 66
End: 2021-11-03
Payer: COMMERCIAL

## 2021-11-03 DIAGNOSIS — M54.50 LUMBAGO: ICD-10-CM

## 2021-11-03 PROCEDURE — 73502 X-RAY EXAM HIP UNI 2-3 VIEWS: CPT

## 2021-11-03 PROCEDURE — 72200 X-RAY EXAM SI JOINTS: CPT

## 2022-03-14 ENCOUNTER — EVALUATION (OUTPATIENT)
Dept: PHYSICAL THERAPY | Facility: CLINIC | Age: 67
End: 2022-03-14
Payer: COMMERCIAL

## 2022-03-14 DIAGNOSIS — M54.16 LUMBAR RADICULOPATHY: Primary | ICD-10-CM

## 2022-03-14 DIAGNOSIS — M54.50 LUMBAR PAIN: ICD-10-CM

## 2022-03-14 PROCEDURE — 97161 PT EVAL LOW COMPLEX 20 MIN: CPT | Performed by: PHYSICAL THERAPIST

## 2022-03-14 PROCEDURE — 97140 MANUAL THERAPY 1/> REGIONS: CPT | Performed by: PHYSICAL THERAPIST

## 2022-03-14 NOTE — LETTER
2022    Chanda Essex, [address] [place] [address]    Patient: Lexa Kelly   YOB: 1955   Date of Visit: 3/14/2022     Encounter Diagnosis     ICD-10-CM    1  Lumbar radiculopathy  M54 16    2  Lumbar pain  M54 50        Dear Dr Brenda Rodriguez: Thank you for your recent referral of Lexa Kelly  Please review the attached evaluation summary from Rafael's recent visit  Please verify that you agree with the plan of care by signing the attached order  If you have any questions or concerns, please do not hesitate to call  I sincerely appreciate the opportunity to share in the care of one of your patients and hope to have another opportunity to work with you in the near future  Sincerely,    Lyndsey Thapa, PT      Referring Provider:      I certify that I have read the below Plan of Care and certify the need for these services furnished under this plan of treatment while under my care  Chanda Essex, PA-C  [address] [facility] [address]  Via Fax: [phone]          PT Evaluation     Today's date: 3/14/2022  Patient name: Lexa Kelly  : 1955  MRN: [id number]  Referring provider: Timothy Acosta PA-C  Dx:   Encounter Diagnosis     ICD-10-CM    1  Lumbar radiculopathy  M54 16    2   Lumbar pain  M54 50        Start Time: 1040          Assessment  Assessment details: Lexa Kelly is a 75 y o  male presenting to outpatient physical therapy at [place] [address] with complaints of chronic low back pain   They present with decreased range of motion, decreased strength, limited flexibility, poor postural awareness, poor body mechanics, poor balance, decreased tolerance to activity and decreased functional mobility due to Lumbar radiculopathy  (primary encounter diagnosis)  Lumbar pain  Jocelyne Muñoz would benefit from skilled physical therapy to address noted impairments in order to allow for full functional return to work and ADL-related activities  Thank you for the referral!  Impairments: abnormal muscle firing, abnormal muscle tone, abnormal or restricted ROM, activity intolerance, impaired balance, impaired physical strength, lacks appropriate home exercise program, pain with function and poor body mechanics  Barriers to therapy: n/a  Understanding of Dx/Px/POC: excellent  Goals  ST   Independent with HEP in 2 weeks  2  Decrease pain by 50% in 3 wks    LT  Achieve FOTO score of 63/100 in 6 weeks   2   Able to sit for 30 mins without pain in 6 weeks  3  Strength = 5/5 hip abd bilat in 6 weeks      Plan  Plan details: RE in 6 weeks  Patient would benefit from: skilled physical therapy  Planned modality interventions: cryotherapy, electrical stimulation/Russian stimulation and thermotherapy: hydrocollator packs  Planned therapy interventions: abdominal trunk stabilization, manual therapy, neuromuscular re-education, therapeutic activities, therapeutic exercise, body mechanics training and home exercise program  Frequency: 2x week  Duration in visits: 12  Duration in weeks: 6  Plan of Care beginning date: 3/14/2022  Plan of Care expiration date: 2022  Treatment plan discussed with: patient        Subjective Evaluation    History of Present Illness  Mechanism of injury: Pt c/o low back, buttock pain that has been chronic for years  R>L  He was seen in this clinic by myself last year for rehab for the lumbar  He says this was not helpful  He notes the R foot "doesn't work" well and recently he slipped and fell on ice, landing on his R side  This exacerbated the back pain  Seen by dr Hillary Smith and got xrays  Negative for acute injury  Pain is located lumbar, buttock, R foot (numbess)  Rated 6/10 when aggravated  Buttock is aggravated with sitting >5 minutes  Back is agg with bending  He has constant numbness in the R foot, which varies in intensity; feels cold especially when he's lying in bed at night  He does feel unsteady while walking and is fearfull of falling  Positive for R flat footed walking  Denies progression of the gait dysfunction  Denies saddle anesthesia, bowel/bladder changes  Pt does building maintenance full time  He has stopped climbing ladders, tried to avoid steps, carrying heavy objects >40#  He is able to sleep at night, normally wakes 1-2x  Patient Goals  Patient goals for therapy: decreased pain, improved balance, increased motion, return to sport/leisure activities, independence with ADLs/IADLs and increased strength          Objective     Postural Observations    Additional Postural Observation Details  Slight L lateral curvature of lumbar; hips symmetrical in standing, no LLD supine    Tenderness     Additional Tenderness Details  No TTP noted   Increased hip flexor tone bilat, R quad muscle trigger points/knots throughout    (-) directional preference  (-) slump  (-) SLR -- to 90 deg bilat  (-) JAVI CRUZ  (+) S/L gina bilat    Hip flexor ROM restricted at 90/90 supine bilat    Neurological Testing     Sensation     Lumbar   Left   Intact: light touch    Right   Intact: light touch    Reflexes   Left   Patellar (L4): normal (2+)  Achilles (S1): trace (1+)    Right   Patellar (L4): trace (1+)  Achilles (S1): trace (1+)    Active Range of Motion   Cervical/Thoracic Spine       Thoracic    Flexion:  WFL  Extension:  WFL  Left lateral flexion:  Restriction level: moderate  Right lateral flexion:  Restriction level: moderate  Left rotation:  Restriction level: moderate  Right rotation:  Restriction level: moderate    Lumbar   Flexion:  WFL and with pain  Extension:  with pain Restriction level: moderate  Left lateral flexion:  Restriction level: minimal  Right lateral flexion:  with pain Restriction level: moderate  Left rotation:  Restriction level: minimal  Right rotation:  Restriction level: minimal    Joint Play   Joints within functional limits: T8, T9, T10, T11, T12 and L1 Hypomobile: L2, L3, L4, L5 and S1   Mechanical Assessment    Cervical      Thoracic      Lumbar    Standing flexion: repeated movements   Pain intensity: worse  Standing extension: repeated movements  Pain location: no change    Strength/Myotome Testing     Left Hip   Planes of Motion   Flexion: 5  Extension: 4+  Abduction: 4-  Adduction: 4+  External rotation: 5  Internal rotation: 5    Right Hip   Planes of Motion   Flexion: 4+  Extension: 3+  Abduction: 4-  Adduction: 4+  External rotation: 5  Internal rotation: 5    Left Knee   Flexion: 5  Extension: 5    Right Knee   Flexion: 5  Extension: 5    Left Ankle/Foot   Dorsiflexion: 5  Plantar flexion: 5    Right Ankle/Foot   Dorsiflexion: 3+  Plantar flexion: 4             Precautions: h/o lumbar pain with R foot numbness, DF weakness  Other factors: n/a  Pt goals: less pain  EPOC: 4/29/22  F/u with referring:      HEP:  Access Code: [email or web address]  URL: [email or web address]  Date: 03/14/2022  Prepared by: Dariel Guess    Exercises  · Supine Quadriceps Stretch with Strap on Table - 3 sets - 20 sec hold  · Supine Posterior Pelvic Tilt - 20 reps  · Hip Flexor Stretch on Step - 10 reps          Manuals 3/14            Quad, hip flexor release/roller ESTEFANI            Lumbar roller                                       Neuro Re-Ed                                                                                                        Ther Ex             bike             Hip flexor stretch on step x10            Anthony stretch w strap 20"x3            PPT x20            PPT+march             PPT+SLR             glute set +bridge             Side step, monster walk             Foam march             Suitcase march             palloff press             [employer] dog                          Ther Activity                                       Gait Training                                       Modalities

## 2022-03-14 NOTE — PROGRESS NOTES
PT Evaluation     Today's date: 3/14/2022  Patient name: Yvon Jimenez  : 1955  MRN: [id number]  Referring provider: Aura Mcdonnell PA-C  Dx:   Encounter Diagnosis     ICD-10-CM    1  Lumbar radiculopathy  M54 16    2  Lumbar pain  M54 50        Start Time: 1040          Assessment  Assessment details: Yvon Jimenez is a 75 y o  male presenting to outpatient physical therapy at [place] [address] with complaints of chronic low back pain   They present with decreased range of motion, decreased strength, limited flexibility, poor postural awareness, poor body mechanics, poor balance, decreased tolerance to activity and decreased functional mobility due to Lumbar radiculopathy  (primary encounter diagnosis)  Lumbar pain  Charinohemi De Paz would benefit from skilled physical therapy to address noted impairments in order to allow for full functional return to work and ADL-related activities  Thank you for the referral!  Impairments: abnormal muscle firing, abnormal muscle tone, abnormal or restricted ROM, activity intolerance, impaired balance, impaired physical strength, lacks appropriate home exercise program, pain with function and poor body mechanics  Barriers to therapy: n/a  Understanding of Dx/Px/POC: excellent  Goals  ST   Independent with HEP in 2 weeks  2  Decrease pain by 50% in 3 wks    LT  Achieve FOTO score of 63/100 in 6 weeks   2   Able to sit for 30 mins without pain in 6 weeks  3  Strength = 5/5 hip abd bilat in 6 weeks      Plan  Plan details: RE in 6 weeks    Patient would benefit from: skilled physical therapy  Planned modality interventions: cryotherapy, electrical stimulation/Russian stimulation and thermotherapy: hydrocollator packs  Planned therapy interventions: abdominal trunk stabilization, manual therapy, neuromuscular re-education, therapeutic activities, therapeutic exercise, body mechanics training and home exercise program  Frequency: 2x week  Duration in visits: 12  Duration in weeks: 6  Plan of Care beginning date: 3/14/2022  Plan of Care expiration date: 4/29/2022  Treatment plan discussed with: patient        Subjective Evaluation    History of Present Illness  Mechanism of injury: Pt c/o low back, buttock pain that has been chronic for years  R>L  He was seen in this clinic by myself last year for rehab for the lumbar  He says this was not helpful  He notes the R foot "doesn't work" well and recently he slipped and fell on ice, landing on his R side  This exacerbated the back pain  Seen by dr René Hogue and got xrays  Negative for acute injury  Pain is located lumbar, buttock, R foot (numbess)  Rated 6/10 when aggravated  Buttock is aggravated with sitting >5 minutes  Back is agg with bending  He has constant numbness in the R foot, which varies in intensity; feels cold especially when he's lying in bed at night  He does feel unsteady while walking and is fearfull of falling  Positive for R flat footed walking  Denies progression of the gait dysfunction  Denies saddle anesthesia, bowel/bladder changes  Pt does building maintenance full time  He has stopped climbing ladders, tried to avoid steps, carrying heavy objects >40#  He is able to sleep at night, normally wakes 1-2x  Patient Goals  Patient goals for therapy: decreased pain, improved balance, increased motion, return to sport/leisure activities, independence with ADLs/IADLs and increased strength          Objective     Postural Observations    Additional Postural Observation Details  Slight L lateral curvature of lumbar; hips symmetrical in standing, no LLD supine    Tenderness     Additional Tenderness Details  No TTP noted   Increased hip flexor tone bilat, R quad muscle trigger points/knots throughout    (-) directional preference  (-) slump  (-) SLR -- to 90 deg bilat  (-) JAVI CRUZ  (+) S/L gina bilat    Hip flexor ROM restricted at 90/90 supine bilat    Neurological Testing     Sensation     Lumbar   Left Intact: light touch    Right   Intact: light touch    Reflexes   Left   Patellar (L4): normal (2+)  Achilles (S1): trace (1+)    Right   Patellar (L4): trace (1+)  Achilles (S1): trace (1+)    Active Range of Motion   Cervical/Thoracic Spine       Thoracic    Flexion:  WFL  Extension:  WFL  Left lateral flexion:  Restriction level: moderate  Right lateral flexion:  Restriction level: moderate  Left rotation:  Restriction level: moderate  Right rotation:  Restriction level: moderate    Lumbar   Flexion:  WFL and with pain  Extension:  with pain Restriction level: moderate  Left lateral flexion:  Restriction level: minimal  Right lateral flexion:  with pain Restriction level: moderate  Left rotation:  Restriction level: minimal  Right rotation:  Restriction level: minimal    Joint Play   Joints within functional limits: T8, T9, T10, T11, T12 and L1     Hypomobile: L2, L3, L4, L5 and S1   Mechanical Assessment    Cervical      Thoracic      Lumbar    Standing flexion: repeated movements   Pain intensity: worse  Standing extension: repeated movements  Pain location: no change    Strength/Myotome Testing     Left Hip   Planes of Motion   Flexion: 5  Extension: 4+  Abduction: 4-  Adduction: 4+  External rotation: 5  Internal rotation: 5    Right Hip   Planes of Motion   Flexion: 4+  Extension: 3+  Abduction: 4-  Adduction: 4+  External rotation: 5  Internal rotation: 5    Left Knee   Flexion: 5  Extension: 5    Right Knee   Flexion: 5  Extension: 5    Left Ankle/Foot   Dorsiflexion: 5  Plantar flexion: 5    Right Ankle/Foot   Dorsiflexion: 3+  Plantar flexion: 4             Precautions: h/o lumbar pain with R foot numbness, DF weakness  Other factors: n/a  Pt goals: less pain  EPOC: 4/29/22  F/u with referring:      HEP:  Access Code: [email or web address]  URL: [email or web address]  Date: 03/14/2022  Prepared by: Dariel Weber    Exercises  · Supine Quadriceps Stretch with Strap on Table - 3 sets - 20 sec hold  · Supine Posterior Pelvic Tilt - 20 reps  · Hip Flexor Stretch on Step - 10 reps          Manuals 3/14            Quad, hip flexor release/roller ESTEFANI            Lumbar roller                                       Neuro Re-Ed                                                                                                        Ther Ex             bike             Hip flexor stretch on step x10            Anthony stretch w strap 20"x3            PPT x20            PPT+march             PPT+SLR             glute set +bridge             Side step, monster walk             Foam march             Suitcase march             palloff press             [employer] dog                          Ther Activity                                       Gait Training                                       Modalities

## 2022-03-17 ENCOUNTER — OFFICE VISIT (OUTPATIENT)
Dept: PHYSICAL THERAPY | Facility: CLINIC | Age: 67
End: 2022-03-17
Payer: COMMERCIAL

## 2022-03-17 DIAGNOSIS — M54.16 LUMBAR RADICULOPATHY: Primary | ICD-10-CM

## 2022-03-17 DIAGNOSIS — M54.50 LUMBAR PAIN: ICD-10-CM

## 2022-03-17 PROCEDURE — 97140 MANUAL THERAPY 1/> REGIONS: CPT | Performed by: PHYSICAL THERAPIST

## 2022-03-17 PROCEDURE — 97110 THERAPEUTIC EXERCISES: CPT | Performed by: PHYSICAL THERAPIST

## 2022-03-17 NOTE — PROGRESS NOTES
Daily Note     Today's date: 3/17/2022  Patient name: Yvon Jimenez  : 1955  MRN: [id number]  Referring provider: Aura Mcdonnell PA-C  Dx:   Encounter Diagnosis     ICD-10-CM    1  Lumbar radiculopathy  M54 16    2  Lumbar pain  M54 50        Start Time: 920          Subjective: Gets cramps almost immediately when doing the stretches  Objective: See treatment diary below      Assessment: Began with quad, hip flexor and posterior chain roller massage  Pt very favorable to this, though some discomfort noted d/t tightness along R quad, ITB, HS and calf  Paired this with stretching and arom for hip flexors, quad, HS and calves  Some cramping noted with prone HSC  Updated HEP  Tolerated treatment well  Patient demonstrated fatigue post treatment and would benefit from continued PT      Plan: Continue per plan of care  Precautions: h/o lumbar pain with R foot numbness, DF weakness  Other factors: n/a  Pt goals: less pain  EPOC: 22  F/u with referring:      HEP:  Access Code: [email or web address]  URL: [email or web address]  Date: 2022  Prepared by: Keny Meyers    Exercises  · Standing Gastroc Stretch on Step - 3 sets - 20 sec hold  · Standing Heel Raise - 10 reps  · Seated Hamstring Stretch - 3 sets - 20 sec hold  · Prone Quadriceps Stretch with Strap - 3 sets - 20 sec hold  · Prone Knee Flexion - 10 reps  · Supine Posterior Pelvic Tilt - 20 reps          Manuals 3/14 3/17           Quad, hip flexor release/roller ESTEFANI JARA           Posterior chain roller  ESTEFANI                                     Neuro Re-Ed                                                                                                        Ther Ex             bike  7'           Hip flexor stretch on step x10            Calf stretch  20"x3           HR  x10           Seated HS stretch  20"x3           Prone quad stretch  20"x4           Prone HSC  x10           Anthony stretch w strap 20"x3            PPT x20 PPT+march             PPT+SLR             glute set +bridge             Side step, monster walk             Foam march             Suitcase march             palloff press             [employer] dog                          Ther Activity                                       Gait Training                                       Modalities

## 2022-03-21 ENCOUNTER — OFFICE VISIT (OUTPATIENT)
Dept: PHYSICAL THERAPY | Facility: CLINIC | Age: 67
End: 2022-03-21
Payer: COMMERCIAL

## 2022-03-21 DIAGNOSIS — M54.50 LUMBAR PAIN: ICD-10-CM

## 2022-03-21 DIAGNOSIS — M54.16 LUMBAR RADICULOPATHY: Primary | ICD-10-CM

## 2022-03-21 PROCEDURE — 97110 THERAPEUTIC EXERCISES: CPT

## 2022-03-21 PROCEDURE — 97140 MANUAL THERAPY 1/> REGIONS: CPT

## 2022-03-21 NOTE — PROGRESS NOTES
Daily Note     Today's date: 3/21/2022  Patient name: Moises Louis  : 1955  MRN: [id number]  Referring provider: Les Gallegos PA-C  Dx:   Encounter Diagnosis     ICD-10-CM    1  Lumbar radiculopathy  M54 16    2  Lumbar pain  M54 50                   Subjective: Pt reports stretching has helped  Not in nearly as much pain anymore and just feels more tightness and some numbness intermittently       Objective: See treatment diary below      Assessment: Today started with bike warm up and self stretches followed by manual therapy  He continues to have positive response to stretching and deep tissue work  Also felt relief from flexion stretch on pball  Tolerated treatment well  Patient demonstrated fatigue post treatment and would benefit from continued PT      Plan: Continue per plan of care  Precautions: h/o lumbar pain with R foot numbness, DF weakness  Other factors: n/a  Pt goals: less pain  EPOC: 22  F/u with referring:      HEP:  Access Code: [email or web address]  URL: [email or web address]  Date: 2022  Prepared by: Sharifa Boswell    Exercises  · Standing Gastroc Stretch on Step - 3 sets - 20 sec hold  · Standing Heel Raise - 10 reps  · Seated Hamstring Stretch - 3 sets - 20 sec hold  · Prone Quadriceps Stretch with Strap - 3 sets - 20 sec hold  · Prone Knee Flexion - 10 reps  · Supine Posterior Pelvic Tilt - 20 reps          Manuals 3/14 3/17 3/21          Quad, hip flexor release/roller ESTEFANI ESTEFANI WE          Posterior chain roller  ESTEFANI WE                                    Neuro Re-Ed                                                                                                        Ther Ex             bike  7' 8'          seated Lumbar flex 3 way on pball   x10 ea          Hip flexor stretch on step x10            Calf stretch  20"x3 20"x3          HR  x10 x20          Seated HS stretch  20"x3 20"x3 ea          Prone quad stretch  20"x4 20"x4 ea          Prone HSC  x10 x15 ea Anthony stretch w strap 20"x3            PPT x20  x20          PPT+march             PPT+SLR             glute set +bridge             Side step, monster walk             Foam march             Suitcase march             palloff press             [employer] dog                          Ther Activity                                       Gait Training                                       Modalities

## 2022-03-22 ENCOUNTER — APPOINTMENT (OUTPATIENT)
Dept: PHYSICAL THERAPY | Facility: CLINIC | Age: 67
End: 2022-03-22
Payer: COMMERCIAL

## 2022-03-24 ENCOUNTER — OFFICE VISIT (OUTPATIENT)
Dept: PHYSICAL THERAPY | Facility: CLINIC | Age: 67
End: 2022-03-24
Payer: COMMERCIAL

## 2022-03-24 DIAGNOSIS — M54.50 LUMBAR PAIN: ICD-10-CM

## 2022-03-24 DIAGNOSIS — M54.16 LUMBAR RADICULOPATHY: Primary | ICD-10-CM

## 2022-03-24 PROCEDURE — 97140 MANUAL THERAPY 1/> REGIONS: CPT | Performed by: PHYSICAL THERAPIST

## 2022-03-24 PROCEDURE — 97110 THERAPEUTIC EXERCISES: CPT | Performed by: PHYSICAL THERAPIST

## 2022-03-24 NOTE — PROGRESS NOTES
Daily Note     Today's date: 3/24/2022  Patient name: Mehrdad Núñez  : 1955  MRN: [id number]  Referring provider: Filiberto Saba PA-C  Dx:   Encounter Diagnosis     ICD-10-CM    1  Lumbar radiculopathy  M54 16    2  Lumbar pain  M54 50        Start Time: 1042          Subjective: Hasn't been doing on long drives/rides lately, so it is hard to tell if his sx are improving  He's been doing a lot of painting while standing on a platform and he notices the longer he goes, the more he fatigues in his legs  Objective: See treatment diary below      Assessment: Continues to fatigue quickly and is very tight in the hip flexors, quads and calves bilat  He gets great benefit from roller massage and manual stretching  Noted feeling loose end of session, but no change in fatigue  Tolerated treatment well  Patient demonstrated fatigue post treatment and would benefit from continued PT      Plan: Continue per plan of care  Treadmill test?     Precautions: h/o lumbar pain with R foot numbness, DF weakness  Other factors: n/a  Pt goals: less pain  EPOC: 22  F/u with referring:      HEP:  Access Code: [email or web address]  URL: [email or web address]/  Date: 2022  Prepared by: Alison Gan    Exercises  · Standing Gastroc Stretch on Step - 3 sets - 20 sec hold  · Standing Heel Raise - 10 reps  · Seated Hamstring Stretch - 3 sets - 20 sec hold  · Prone Quadriceps Stretch with Strap - 3 sets - 20 sec hold  · Prone Knee Flexion - 10 reps  · Supine Posterior Pelvic Tilt - 20 reps          Manuals 3/14 3/17 3/21 3/24         Quad, hip flexor release/roller ESTEFANI ESTEFANI WE ESTEFANI         Posterior chain roller  ESTEFANI WE ESTEFANI         Quad, hip flexor stretching    Prone ESTEFANI                                   Neuro Re-Ed                                                                                                        Ther Ex             bike  7' 8' 7'         seated Lumbar flex 3 way on pball   x10 ea x10         Hip flexor stretch on step x10            Calf stretch  20"x3 20"x3          HR  x10 x20          Seated HS stretch  20"x3 20"x3 ea 20"x4 ea         Prone quad stretch  20"x4 20"x4 ea          Prone HSC  x10 x15 ea          Anthony stretch w strap 20"x3            PPT x20  x20 5"x20         PPT+march    x20         PPT+SLR             glute set +bridge             Side step, monster walk             Foam march             Suitcase march             palloff press             Bird dog                          Ther Activity                                       Gait Training                                       Modalities

## 2022-03-28 ENCOUNTER — APPOINTMENT (OUTPATIENT)
Dept: PHYSICAL THERAPY | Facility: CLINIC | Age: 67
End: 2022-03-28
Payer: COMMERCIAL

## 2022-03-31 ENCOUNTER — OFFICE VISIT (OUTPATIENT)
Dept: PHYSICAL THERAPY | Facility: CLINIC | Age: 67
End: 2022-03-31
Payer: COMMERCIAL

## 2022-03-31 DIAGNOSIS — M54.16 LUMBAR RADICULOPATHY: Primary | ICD-10-CM

## 2022-03-31 DIAGNOSIS — M54.50 LUMBAR PAIN: ICD-10-CM

## 2022-03-31 PROCEDURE — 97110 THERAPEUTIC EXERCISES: CPT | Performed by: PHYSICAL THERAPIST

## 2022-03-31 PROCEDURE — 97140 MANUAL THERAPY 1/> REGIONS: CPT | Performed by: PHYSICAL THERAPIST

## 2022-03-31 NOTE — PROGRESS NOTES
Daily Note     Today's date: 3/31/2022  Patient name: Manuel Franco  : 1955  MRN: [id number]  Referring provider: Sheri Mckeon PA-C  Dx:   Encounter Diagnosis     ICD-10-CM    1  Lumbar radiculopathy  M54 16    2  Lumbar pain  M54 50        Start Time: 1025          Subjective: Maybe a little bit better? His low back and L hip are bothering him now, likely due to all the up and down he's been doing lately while painting  Objective: See treatment diary below    Incline 0: 1 8 mph, sx onset at 3:15  5 min rest  Incline 9: 1 8 mph, sx onset at 0:46      Assessment: Continues to fatigue quickly with LE muscle cramping during cardiovascular exercise  Treadmill test results negative for neurogenic claudication symptoms, positive for possible vascular claudication  Pedal pulses good, RLE skin coloration and hair is normal  Possible diet-component to increased muscle cramping; will recommend trial of supplemented potassium  Considering his h/o inactivity over the past year, there is likely a low tolerance to activity secondary to overall cardiac health  Will continue with gradual interval-type training and monitor symptoms throughout  Tolerated treatment well  Patient demonstrated fatigue post treatment and would benefit from continued PT      Plan: Continue per plan of care  Recommend potassium supplement/bananas for cramping  Continue with cardio intervals using pain/fatigue as stopping point  Precautions: h/o lumbar pain with R foot numbness, DF weakness  Other factors: n/a  Pt goals: less pain  EPOC: 22  F/u with referring:      HEP:  Access Code: [email or web address]  URL: [email or web address]  Date: 2022  Prepared by: Chaim Cordova    Exercises  · Standing Gastroc Stretch on Step - 3 sets - 20 sec hold  · Standing Heel Raise - 10 reps  · Seated Hamstring Stretch - 3 sets - 20 sec hold  · Prone Quadriceps Stretch with Strap - 3 sets - 20 sec hold  · Prone Knee Flexion - 10 reps  · Supine Posterior Pelvic Tilt - 20 reps          Manuals 3/14 3/17 3/21 3/24 3/31        Quad, hip flexor release/roller ESTEFANI JARA        Posterior chain roller  ESTEFANI JARA         Quad, hip flexor stretching    Prone ESTEFANI                                   Neuro Re-Ed                                                                                                        Ther Ex             bike  7' 8' 7' 7'        seated Lumbar flex 3 way on pball   x10 ea x10         Lumbar arom     x10        Hip flexor stretch on step x10    x10        Calf stretch  20"x3 20"x3          HR  x10 x20          Seated HS stretch  20"x3 20"x3 ea 20"x4 ea 20"x3 ea        glute stretch     20"x3        Prone quad stretch  20"x4 20"x4 ea          Prone HSC  x10 x15 ea          Anthony stretch w strap 20"x3            PPT x20  x20 5"x20 5"x20        PPT+march    x20         PPT+SLR             glute set +bridge             Side step, monster walk             Foam march             Suitcase march             palloff press             [employer] dog                          Ther Activity                                       Gait Training             TM test     10'                     Modalities

## 2022-04-04 ENCOUNTER — OFFICE VISIT (OUTPATIENT)
Dept: PHYSICAL THERAPY | Facility: CLINIC | Age: 67
End: 2022-04-04
Payer: COMMERCIAL

## 2022-04-04 DIAGNOSIS — M54.50 LUMBAR PAIN: ICD-10-CM

## 2022-04-04 DIAGNOSIS — M54.16 LUMBAR RADICULOPATHY: Primary | ICD-10-CM

## 2022-04-04 PROCEDURE — 97110 THERAPEUTIC EXERCISES: CPT | Performed by: PHYSICAL THERAPIST

## 2022-04-04 NOTE — PROGRESS NOTES
Daily Note     Today's date: 2022  Patient name: Myra Reyna  : 1955  MRN: [id number]  Referring provider: Jeffery Rios PA-C  Dx:   Encounter Diagnosis     ICD-10-CM    1  Lumbar radiculopathy  M54 16    2  Lumbar pain  M54 50        Start Time: 1000          Subjective: Ate a couple bananas towards the end of last week and noticed he had less cramping in his legs the past couple days  The times where he gets the most fatigued, quickly is when he walks up a flight of stairs  Objective: See treatment diary below    Incline 0: 1 8 mph, sx onset at 3:15  5 min rest  Incline 9: 1 8 mph, sx onset at 0:46      Assessment: Instructed pt to eat one banana a day for the potassium and monitor cramping symptoms  Intro'd interval walking for endurance building today  By the 4th round, pt noted feeling increased tingling/numbness to the RLE, but on a fatigue scale out of 10, he rated his LE fatigue as 3/10  He also continues to have L hip pain  Ended interval walking with hip stretching  Tolerated treatment well  Patient demonstrated fatigue post treatment and would benefit from continued PT      Plan: Continue per plan of care  Assess potassium supplement/bananas for cramping  Continue with cardio intervals using pain/fatigue as stopping point  Precautions: h/o lumbar pain with R foot numbness, DF weakness  Other factors: n/a  Pt goals: less pain  EPOC: 22  F/u with referring:      HEP:  Access Code: [email or web address]  URL: [email or web address]  Date: 2022  Prepared by: Curly Cohn    Exercises  · Standing Gastroc Stretch on Step - 3 sets - 20 sec hold  · Standing Heel Raise - 10 reps  · Seated Hamstring Stretch - 3 sets - 20 sec hold  · Prone Quadriceps Stretch with Strap - 3 sets - 20 sec hold  · Prone Knee Flexion - 10 reps  · Supine Posterior Pelvic Tilt - 20 reps          Manuals 3/14 3/17 3/21 3/24 3/31 4/4       Quad, hip flexor release/roller  Batsheva Lei Posterior chain roller  ESTEFANI WE ESTEFANI         Quad, hip flexor stretching    Prone ESTEFANI                                   Neuro Re-Ed                                                                                                        Ther Ex             bike  7' 8' 7' 7'        TM      2' on  2' off   1 8 mph  x5       seated Lumbar flex 3 way on pball   x10 ea x10         Lumbar arom     x10 x10       Hip flexor stretch on step x10    x10        Calf stretch  20"x3 20"x3   1'       HR  x10 x20          Seated HS stretch  20"x3 20"x3 ea 20"x4 ea 20"x3 ea Strap 1'       glute stretch     20"x3 1'       Prone quad stretch  20"x4 20"x4 ea          Prone HSC  x10 x15 ea          Anthony stretch w strap 20"x3     1'       PPT x20  x20 5"x20 5"x20        PPT+march    x20         PPT+SLR             glute set +bridge             Side step, monster walk             Foam march             Suitcase march             palloff press             Sallie Mccord dog                          Ther Activity                                       Gait Training             TM test     10'                     Modalities

## 2022-04-07 ENCOUNTER — APPOINTMENT (OUTPATIENT)
Dept: PHYSICAL THERAPY | Facility: CLINIC | Age: 67
End: 2022-04-07
Payer: COMMERCIAL

## 2022-04-08 ENCOUNTER — OFFICE VISIT (OUTPATIENT)
Dept: PHYSICAL THERAPY | Facility: CLINIC | Age: 67
End: 2022-04-08
Payer: COMMERCIAL

## 2022-04-08 DIAGNOSIS — M54.50 LUMBAR PAIN: ICD-10-CM

## 2022-04-08 DIAGNOSIS — M54.16 LUMBAR RADICULOPATHY: Primary | ICD-10-CM

## 2022-04-08 PROCEDURE — 97110 THERAPEUTIC EXERCISES: CPT

## 2022-04-08 NOTE — PROGRESS NOTES
Daily Note     Today's date: 2022  Patient name: Debi Mckinney  : 1955  MRN: [id number]  Referring provider: Nuha Enriquez PA-C  Dx:   Encounter Diagnosis     ICD-10-CM    1  Lumbar radiculopathy  M54 16    2  Lumbar pain  M54 50                   Subjective: Pt reports walking around outside he gets LBP after a while  States still having numbness in the foot but not as bad  Objective: See treatment diary below      Assessment: Tolerated treatment fair  Patient c/o LE fatigue at last round of 2 min walk  Pt may be able to tamara increase in time intervals NV  Pt reported he was relying on the handrails of TM so he had better tamara then outdoors walking  C/o fatigue/soreness in LB post walking  Needs LB strengthening  Plan: Continue per plan of care  Progress treatment as tolerated  Precautions: h/o lumbar pain with R foot numbness, DF weakness  Other factors: n/a  Pt goals: less pain  EPOC: 22  F/u with referring:      HEP:  Access Code: [email or web address]  URL: [email or web address]  Date: 2022  Prepared by: Shannon Feliciano    Exercises  · Standing Gastroc Stretch on Step - 3 sets - 20 sec hold  · Standing Heel Raise - 10 reps  · Seated Hamstring Stretch - 3 sets - 20 sec hold  · Prone Quadriceps Stretch with Strap - 3 sets - 20 sec hold  · Prone Knee Flexion - 10 reps  · Supine Posterior Pelvic Tilt - 20 reps          Manuals 3/14 3/17 3/21 3/24 3/31 4/4 4/8      Quad, hip flexor release/roller ESTEFANI JARA ESTEFANI        Posterior chain roller  ESTEFANI JARA         Quad, hip flexor stretching    Prone ESTEFANI                                   Neuro Re-Ed                                                                                                        Ther Ex             bike  7' 8' 7' 7'        TM      2' on  2' off   1 8 mph  x5 2' on  2' off   2 mph  x5      seated Lumbar flex 3 way on pball   x10 ea x10         Lumbar arom     x10 x10 x10      Hip flexor stretch on step x10    x10        Calf stretch  20"x3 20"x3   1' 1'      HR  x10 x20          Seated HS stretch  20"x3 20"x3 ea 20"x4 ea 20"x3 ea Strap 1' Strap 1'      glute stretch     20"x3 1'       Prone quad stretch  20"x4 20"x4 ea          Prone HSC  x10 x15 ea          Anthony stretch w strap 20"x3     1' 1'      PPT x20  x20 5"x20 5"x20        PPT+march    x20         PPT+SLR             glute set +bridge             Side step, monster walk             Foam march             Suitcase march             palloff press             Minna Full[employer] dog                          Ther Activity                                       Gait Training             TM test     10'                     Modalities

## 2022-04-11 ENCOUNTER — OFFICE VISIT (OUTPATIENT)
Dept: PHYSICAL THERAPY | Facility: CLINIC | Age: 67
End: 2022-04-11
Payer: COMMERCIAL

## 2022-04-11 DIAGNOSIS — M54.16 LUMBAR RADICULOPATHY: Primary | ICD-10-CM

## 2022-04-11 DIAGNOSIS — M54.50 LUMBAR PAIN: ICD-10-CM

## 2022-04-11 PROCEDURE — 97110 THERAPEUTIC EXERCISES: CPT | Performed by: PHYSICAL THERAPIST

## 2022-04-11 NOTE — PROGRESS NOTES
Daily Note     Today's date: 2022  Patient name: Rowena Hdez  : 1955  MRN: [id number]  Referring provider: Mikie Macias PA-C  Dx:   Encounter Diagnosis     ICD-10-CM    1  Lumbar radiculopathy  M54 16    2  Lumbar pain  M54 50        Start Time: 1003          Subjective: Feeling a little better overall since starting the walking program  Cramps have been better when he eats bananas, but he forgot yesterday and woke up with cramps last night  L hip has been bothering him a lot  Objective: See treatment diary below      Assessment: Began with stretching today in effort to off-load the hip a bit prior to walking program  Still had hip soreness with walking  Instructed to complete walks without holding onto the railings for support  Tolerated treatment fair  Will benefit from continued therapy  Plan: Continue per plan of care  Progress treatment as tolerated  Precautions: h/o lumbar pain with R foot numbness, DF weakness  Other factors: n/a  Pt goals: less pain  EPOC: 22  F/u with referring:      HEP:  Access Code: [email or web address]  URL: [email or web address]  Date: 2022  Prepared by: Gill Wright    Exercises  · Standing Gastroc Stretch on Step - 3 sets - 20 sec hold  · Standing Heel Raise - 10 reps  · Seated Hamstring Stretch - 3 sets - 20 sec hold  · Prone Quadriceps Stretch with Strap - 3 sets - 20 sec hold  · Prone Knee Flexion - 10 reps  · Supine Posterior Pelvic Tilt - 20 reps          Manuals 3/14 3/17 3/21 3/24 3/31 4/4 4/8 4/11     Quad, hip flexor release/roller ESTEFANI ESTEFANI WE ESTEFANI ESTEFANI        Posterior chain roller  ESTEFANI WE ESTEFANI         Quad, hip flexor stretching    Prone ESTEFANI                                   Neuro Re-Ed                                                                                                        Ther Ex             bike  7' 8' 7' 7'        TM      2' on  2' off   1 8 mph  x5 2' on  2' off   2 mph  x5 2' on  2' off   1 8 mph  x5     seated Lumbar flex 3 way on pball   x10 ea x10         Lumbar arom     x10 x10 x10      Hip flexor stretch on step x10    x10        Calf stretch  20"x3 20"x3   1' 1'      HR  x10 x20          Seated HS stretch  20"x3 20"x3 ea 20"x4 ea 20"x3 ea Strap 1' Strap 1' Strap 10"x10     glute stretch     20"x3 1'  supine 10"x10     Prone quad stretch  20"x4 20"x4 ea          Prone HSC  x10 x15 ea          Anthony stretch w strap 20"x3     1' 1' 10"x10     PPT x20  x20 5"x20 5"x20        PPT+march    x20         PPT+SLR             glute set +bridge             Side step, monster walk             Foam march             Suitcase march             palloff press             [facility] dog                          Ther Activity                                       Gait Training             TM test     10'                     Modalities

## 2022-04-14 ENCOUNTER — APPOINTMENT (OUTPATIENT)
Dept: PHYSICAL THERAPY | Facility: CLINIC | Age: 67
End: 2022-04-14
Payer: COMMERCIAL

## 2022-04-18 ENCOUNTER — OFFICE VISIT (OUTPATIENT)
Dept: PHYSICAL THERAPY | Facility: CLINIC | Age: 67
End: 2022-04-18
Payer: COMMERCIAL

## 2022-04-18 DIAGNOSIS — M54.50 LUMBAR PAIN: ICD-10-CM

## 2022-04-18 DIAGNOSIS — M54.16 LUMBAR RADICULOPATHY: Primary | ICD-10-CM

## 2022-04-18 PROCEDURE — 97110 THERAPEUTIC EXERCISES: CPT | Performed by: PHYSICAL THERAPIST

## 2022-04-18 PROCEDURE — 97112 NEUROMUSCULAR REEDUCATION: CPT | Performed by: PHYSICAL THERAPIST

## 2022-04-18 NOTE — PROGRESS NOTES
Daily Note     Today's date: 2022  Patient name: Alfredo Gray  : 1955  MRN: [id number]  Referring provider: Song Savage PA-C  Dx:   Encounter Diagnosis     ICD-10-CM    1  Lumbar radiculopathy  M54 16    2  Lumbar pain  M54 50        Start Time: 09          Subjective: Pretty good today besides some soreness in the calves  Eating bananas has been helping the cramping  Objective: See treatment diary below      Assessment: Increased "on" time during TM cardio training today  He continues to c/o L hip pain within the first 5 minutes of walking  Ended with light core and hip strenghtening  Tolerated treatment fair  Will benefit from continued therapy  Plan: Continue per plan of care  Progress treatment as tolerated  Precautions: h/o lumbar pain with R foot numbness, DF weakness  Other factors: n/a  Pt goals: less pain  EPOC: 22  F/u with referring:      HEP:  Access Code: [email or web address]  URL: [email or web address]/  Date: 2022  Prepared by: Kenroy Beat    Exercises  · Standing Gastroc Stretch on Step - 3 sets - 20 sec hold  · Standing Heel Raise - 10 reps  · Seated Hamstring Stretch - 3 sets - 20 sec hold  · Prone Quadriceps Stretch with Strap - 3 sets - 20 sec hold  · Prone Knee Flexion - 10 reps  · Supine Posterior Pelvic Tilt - 20 reps          Manuals 3/14 3/17 3/21 3/24 3/31 4/4 4/8 4/11 4/18    Quad, hip flexor release/roller ESTEFANI JARA WE ESTEFANI ESTEFANI        Posterior chain roller  ESTEFANI JARA         Quad, hip flexor stretching    Prone ESTEFANI                                   Neuro Re-Ed                                                                                                        Ther Ex             bike  7' 8' 7' 7'        TM      2' on  2' off   1 8 mph  x5 2' on  2' off   2 mph  x5 2' on  2' off   1 8 mph  x5 2:15' on  2' off   1 8 mph  x5    seated Lumbar flex 3 way on pball   x10 ea x10         Lumbar arom     x10 x10 x10      Hip flexor stretch on step x10    x10        Calf stretch  20"x3 20"x3   1' 1'  20"x3    HR  x10 x20          Seated HS stretch  20"x3 20"x3 ea 20"x4 ea 20"x3 ea Strap 1' Strap 1' Strap 10"x10 20"x3    glute stretch     20"x3 1'  supine 10"x10 20"x3    Prone quad stretch  20"x4 20"x4 ea          Prone HSC  x10 x15 ea          Anthony stretch w strap 20"x3     1' 1' 10"x10     PPT x20  x20 5"x20 5"x20    5"x20    PPT+march    x20         PPT+SLR         5"x10    glute set +bridge         5"x10    HL hip abd         rtb 5"x10                                                                     Ther Activity                                       Gait Training             TM test     10'                     Modalities

## 2022-04-21 ENCOUNTER — OFFICE VISIT (OUTPATIENT)
Dept: PHYSICAL THERAPY | Facility: CLINIC | Age: 67
End: 2022-04-21
Payer: COMMERCIAL

## 2022-04-21 DIAGNOSIS — M54.50 LUMBAR PAIN: ICD-10-CM

## 2022-04-21 DIAGNOSIS — M54.16 LUMBAR RADICULOPATHY: Primary | ICD-10-CM

## 2022-04-21 PROCEDURE — 97110 THERAPEUTIC EXERCISES: CPT | Performed by: PHYSICAL THERAPIST

## 2022-04-21 NOTE — PROGRESS NOTES
Daily Note     Today's date: 2022  Patient name: Rodrigo Handley  : 1955  MRN: [id number]  Referring provider: Aysha Boothe PA-C  Dx:   Encounter Diagnosis     ICD-10-CM    1  Lumbar radiculopathy  M54 16    2  Lumbar pain  M54 50        Start Time: 915          Subjective: Not too bad today  Objective: See treatment diary below      Assessment: Gait during TM endurance improving, less antalgic secondary to L hip pain  Only mild cramping today  Tolerated treatment fair  Will benefit from continued therapy  Plan: Continue per plan of care  Progress treatment as tolerated  Precautions: h/o lumbar pain with R foot numbness, DF weakness  Other factors: n/a  Pt goals: less pain  EPOC: 22  F/u with referring:      HEP:  Access Code: [email or web address]  URL: [email or web address]/  Date: 2022  Prepared by: Temple Socks    Exercises  · Standing Gastroc Stretch on Step - 3 sets - 20 sec hold  · Standing Heel Raise - 10 reps  · Seated Hamstring Stretch - 3 sets - 20 sec hold  · Prone Quadriceps Stretch with Strap - 3 sets - 20 sec hold  · Prone Knee Flexion - 10 reps  · Supine Posterior Pelvic Tilt - 20 reps          Manuals 3/14 3/17 3/21 3/24 3/31 4/4 4/8 4/11 4/18 4/21   Quad, hip flexor release/roller ESTEFANI ESTEFANI WE ESTEFANI ESTEFANI        Posterior chain roller  ESTEFANI WE ESTEFANI         Quad, hip flexor stretching    Prone ESTEFANI                                   Neuro Re-Ed                                                                                                        Ther Ex             bike  7' 8' 7' 7'        TM      2' on  2' off   1 8 mph  x5 2' on  2' off   2 mph  x5 2' on  2' off   1 8 mph  x5 2:15' on  2' off   1 8 mph  x5 2:15' on  2' off   1 8 mph  x5   seated Lumbar flex 3 way on pball   x10 ea x10         Lumbar arom     x10 x10 x10      Hip flexor stretch on step x10    x10        Calf stretch  20"x3 20"x3   1' 1'  20"x3 20"x3   HR  x10 x20          Seated HS stretch  20"x3 20"x3 ea 20"x4 ea 20"x3 ea Strap 1' Strap 1' Strap 10"x10 20"x3 20"x3   glute stretch     20"x3 1'  supine 10"x10 20"x3 20"x3   Prone quad stretch  20"x4 20"x4 ea          Prone HSC  x10 x15 ea          Anthony stretch w strap 20"x3     1' 1' 10"x10     PPT x20  x20 5"x20 5"x20    5"x20 5"x20   PPT+march    x20         PPT+SLR         5"x10 5"x10   glute set +bridge         5"x10 5"x10   HL hip abd         rtb 5"x10 rtb 5"x10                                                                    Ther Activity                                       Gait Training             TM test     10'                     Modalities

## 2022-04-25 ENCOUNTER — OFFICE VISIT (OUTPATIENT)
Dept: PHYSICAL THERAPY | Facility: CLINIC | Age: 67
End: 2022-04-25
Payer: COMMERCIAL

## 2022-04-25 DIAGNOSIS — M54.16 LUMBAR RADICULOPATHY: Primary | ICD-10-CM

## 2022-04-25 DIAGNOSIS — M54.50 LUMBAR PAIN: ICD-10-CM

## 2022-04-25 PROCEDURE — 97110 THERAPEUTIC EXERCISES: CPT | Performed by: PHYSICAL THERAPIST

## 2022-04-25 NOTE — PROGRESS NOTES
Daily Note     Today's date: 2022  Patient name: Monty Xiong  : 1955  MRN: [id number]  Referring provider: Bulmaro Schaffer PA-C  Dx:   Encounter Diagnosis     ICD-10-CM    1  Lumbar radiculopathy  M54 16    2  Lumbar pain  M54 50        Start Time: 1001          Subjective: Hip is a little sore today but otherwise good  Objective: See treatment diary below      Assessment: Increased interval "on" time today  No cramping during walking program, but L hip bothering  Tolerated treatment fair  Will benefit from continued therapy  Plan: Continue per plan of care  Progress treatment as tolerated  RE nv  Pt would not like to schedule more visits past next week until after his lumbar MRI  Precautions: h/o lumbar pain with R foot numbness, DF weakness  Other factors: n/a  Pt goals: less pain  EPOC: 22  F/u with referring:      HEP:  Access Code: [email or web address]  URL: [email or web address]  Date: 2022  Prepared by: Tanvir Thompson    Exercises  · Standing Gastroc Stretch on Step - 3 sets - 20 sec hold  · Standing Heel Raise - 10 reps  · Seated Hamstring Stretch - 3 sets - 20 sec hold  · Prone Quadriceps Stretch with Strap - 3 sets - 20 sec hold  · Prone Knee Flexion - 10 reps  · Supine Posterior Pelvic Tilt - 20 reps          Manuals    Quad, hip flexor release/roller             Posterior chain roller             Quad, hip flexor stretching                                       Neuro Re-Ed                                                    Ther Ex             bike             TM 2:30' on  2' off   1 8 mph  x5      2' on  2' off   2 mph  x5 2' on  2' off   1 8 mph  x5 2:15' on  2' off   1 8 mph  x5 2:15' on  2' off   1 8 mph  x5   seated Lumbar flex 3 way on pball             Lumbar arom       x10      Hip flexor stretch on step             Calf stretch 20"x3      1'  20"x3 20"x3   HR             Seated HS stretch 20"x3      Strap 1' Strap 10"x10 20"x3 20"x3   glute stretch 20"x3       supine 10"x10 20"x3 20"x3   Prone quad stretch             Prone HSC             Anthony stretch w strap       1' 10"x10     PPT 10"x10        5"x20 5"x20   PPT+march             PPT+SLR 5"x15        5"x10 5"x10   glute set +bridge 5"x15        5"x10 5"x10   HL hip abd         rtb 5"x10 rtb 5"x10                                                                    Ther Activity                                       Gait Training             TM test                          Modalities

## 2022-04-28 ENCOUNTER — HOSPITAL ENCOUNTER (OUTPATIENT)
Dept: MRI IMAGING | Facility: HOSPITAL | Age: 67
Discharge: HOME | End: 2022-04-28
Payer: COMMERCIAL

## 2022-04-28 ENCOUNTER — EVALUATION (OUTPATIENT)
Dept: PHYSICAL THERAPY | Facility: CLINIC | Age: 67
End: 2022-04-28
Payer: COMMERCIAL

## 2022-04-28 DIAGNOSIS — M54.16 LUMBAR RADICULOPATHY: Primary | ICD-10-CM

## 2022-04-28 DIAGNOSIS — M54.50 LOW BACK PAIN: ICD-10-CM

## 2022-04-28 DIAGNOSIS — M54.16 RADICULOPATHY, LUMBAR REGION: ICD-10-CM

## 2022-04-28 DIAGNOSIS — R20.2 PARESTHESIA OF SKIN: ICD-10-CM

## 2022-04-28 DIAGNOSIS — M54.50 LUMBAR PAIN: ICD-10-CM

## 2022-04-28 PROCEDURE — G1004 CDSM NDSC: HCPCS

## 2022-04-28 PROCEDURE — 97110 THERAPEUTIC EXERCISES: CPT | Performed by: PHYSICAL THERAPIST

## 2022-04-28 PROCEDURE — A9585 GADOBUTROL INJECTION: HCPCS | Performed by: RADIOLOGY

## 2022-04-28 PROCEDURE — 72158 MRI LUMBAR SPINE W/O & W/DYE: CPT

## 2022-04-28 PROCEDURE — 97140 MANUAL THERAPY 1/> REGIONS: CPT | Performed by: PHYSICAL THERAPIST

## 2022-04-28 RX ADMIN — GADOBUTROL 9 ML: 604.72 INJECTION INTRAVENOUS at 12:43

## 2022-04-28 NOTE — PROGRESS NOTES
PT Re-Evaluation     Today's date: 2022  Patient name: Erika Lynn  : 1955  MRN: [id number]  Referring provider: Marco Grider PA-C  Dx:   Encounter Diagnosis     ICD-10-CM    1  Lumbar radiculopathy  M54 16    2  Lumbar pain  M54 50                   Assessment  Assessment details: Erika Lynn is a 75 y o  male presenting to outpatient physical therapy at [place] [id number] with complaints of chronic low back pain   They present with decreased range of motion, decreased strength, limited flexibility, poor postural awareness, poor body mechanics, poor balance, decreased tolerance to activity and decreased functional mobility due to Lumbar radiculopathy  (primary encounter diagnosis)  Lumbar pain  Juan George would benefit from skilled physical therapy to address noted impairments in order to allow for full functional return to work and ADL-related activities  Thank you for the referral!    RE: 22  Erika Lynn has attended physical therapy for 12 visits  In this time, they have made MILD improvements in symptoms and function, as noted by subjective report, improved balance, ROM, strength, flexibility and activity tolerance  They have made MILD positive goal progress with FOTO score improvement from 52 at initial evaluation to 79 currently  They do continue to have impairments in pain, ROM, strength, balance, flexibility and activity tolerance  Recommend continued skilled PT in order to maximize function  Impairments: abnormal muscle firing, abnormal muscle tone, abnormal or restricted ROM, activity intolerance, impaired balance, impaired physical strength, lacks appropriate home exercise program, pain with function and poor body mechanics  Barriers to therapy: n/a  Understanding of Dx/Px/POC: excellent  Goals  ST   Independent with HEP in 2 weeks - met  2  Decrease pain by 50% in 3 wks - near met    LT   Achieve FOTO score of 63/100 in 6 weeks - met  2   Able to sit for 30 mins without pain in 6 weeks - not met  3  Strength = 5/5 hip abd bilat in 6 weeks - not met      Plan  Plan details: RE in 6 weeks  Patient would benefit from: skilled physical therapy  Planned modality interventions: cryotherapy, electrical stimulation/Russian stimulation and thermotherapy: hydrocollator packs  Planned therapy interventions: abdominal trunk stabilization, manual therapy, neuromuscular re-education, therapeutic activities, therapeutic exercise, body mechanics training and home exercise program  Frequency: 2x week  Duration in visits: 12  Duration in weeks: 6  Plan of Care beginning date: 4/28/2022  Plan of Care expiration date: 6/10/2022  Treatment plan discussed with: patient        Subjective Evaluation    History of Present Illness  Mechanism of injury: Pt c/o low back, buttock pain that has been chronic for years  R>L  He was seen in this clinic by myself last year for rehab for the lumbar  He says this was not helpful  He notes the R foot "doesn't work" well and recently he slipped and fell on ice, landing on his R side  This exacerbated the back pain  Seen by dr Raeann Lagunas and got xrays  Negative for acute injury  Pain is located lumbar, buttock, R foot (numbess)  Rated 6/10 when aggravated  Buttock is aggravated with sitting >5 minutes  Back is agg with bending  He has constant numbness in the R foot, which varies in intensity; feels cold especially when he's lying in bed at night  He does feel unsteady while walking and is fearfull of falling  Positive for R flat footed walking  Denies progression of the gait dysfunction  Denies saddle anesthesia, bowel/bladder changes  Pt does building maintenance full time  He has stopped climbing ladders, tried to avoid steps, carrying heavy objects >40#  He is able to sleep at night, normally wakes 1-2x  RE: 4/28/22  Pt reports improvement in his back pain and cramping  He's been eating bananas daily and the increased potassium has seemed to help  His back no longer bothers him with bending  The constant numbness in the R foot has decreased in intensity and he is less unsteady while walking  He does continue to have considerable pain in the buttock and L hip, which worsens with increased activity or prolonged sitting  He has a lumbar MRI today  He would like to hold off on scheduling more PT visits passed next week until he has the MRI results  Patient Goals  Patient goals for therapy: decreased pain, improved balance, increased motion, return to sport/leisure activities, independence with ADLs/IADLs and increased strength          Objective     Postural Observations    Additional Postural Observation Details  Slight L lateral curvature of lumbar; hips symmetrical in standing, no LLD supine    Tenderness     Additional Tenderness Details  No TTP noted   Increased hip flexor tone bilat, R quad muscle trigger points/knots throughout    (-) directional preference  (-) slump  (-) SLR -- to 90 deg bilat  (-) JAVI CRUZ  (+) S/L gina bilat    Hip flexor ROM restricted at 90/90 supine bilat    Neurological Testing     Sensation     Lumbar   Left   Intact: light touch    Right   Intact: light touch    Reflexes   Left   Patellar (L4): normal (2+)  Achilles (S1): trace (1+)    Right   Patellar (L4): trace (1+)  Achilles (S1): trace (1+)    Active Range of Motion   Cervical/Thoracic Spine       Thoracic    Flexion:  WFL  Extension:  WFL  Left lateral flexion:  Restriction level: moderate  Right lateral flexion:  Restriction level: moderate  Left rotation:  Restriction level: moderate  Right rotation:  Restriction level: moderate    Lumbar   Flexion:  WFL  Extension:  with pain Restriction level: moderate  Left lateral flexion:  Restriction level: minimal  Right lateral flexion:  Restriction level: minimal  Left rotation:  Restriction level: minimal  Right rotation:  Restriction level: minimal    Joint Play   Joints within functional limits: T8, T9, T10, T11, T12 and L1     Hypomobile: L2, L3, L4, L5 and S1   Mechanical Assessment    Cervical      Thoracic      Lumbar    Standing flexion: repeated movements   Pain location:no change  Standing extension: repeated movements  Pain location: no change    Strength/Myotome Testing     Left Hip   Planes of Motion   Flexion: 5  Extension: 4+  Abduction: 4+  Adduction: 4+  External rotation: 5  Internal rotation: 5    Right Hip   Planes of Motion   Flexion: 4+  Extension: 3+  Abduction: 4+  Adduction: 4+  External rotation: 5  Internal rotation: 5    Left Knee   Flexion: 5  Extension: 5    Right Knee   Flexion: 5  Extension: 5    Left Ankle/Foot   Dorsiflexion: 5  Plantar flexion: 5    Right Ankle/Foot   Dorsiflexion: 3+  Plantar flexion: 4             Precautions: h/o lumbar pain with R foot numbness, DF weakness  Other factors: n/a  Pt goals: less pain  EPOC: 4/29/22  F/u with referring:      HEP:  Access Code: [email or web address]  URL: [email or web address]  Date: 04/28/2022  Prepared by: Yuriy Ruiz    Exercises  · Standing Gastroc Stretch on Step - 3 sets - 20 sec hold  · Seated Hamstring Stretch - 3 sets - 20 sec hold  · Prone Quadriceps Stretch with Strap - 3 sets - 20 sec hold  · Sidelying Hip Abduction - 2 sets - 10 reps  · Prone Hip Extension - 2 sets - 10 reps  · Standing Forward Trunk Flexion - 10 reps  · Standing Sidebends - 10 reps  · Standing Lumbar Extension - 10 reps          Manuals 4/25 4/28 4/8 4/11 4/18 4/21   RE  ESTEFANI           Quad, hip flexor release/roller             Posterior chain roller             Quad, hip flexor stretching                                       Neuro Re-Ed                                                    Ther Ex             bike             TM 2:30' on  2' off   1 8 mph  x5 2:30' on  2' off   1 8 mph  x5     2' on  2' off   2 mph  x5 2' on  2' off   1 8 mph  x5 2:15' on  2' off   1 8 mph  x5 2:15' on  2' off   1 8 mph  x5   seated Lumbar flex 3 way on pball             Lumbar arom  x10     x10      Hip flexor stretch on step             Calf stretch 20"x3 20"x3     1'  20"x3 20"x3   HR             Seated HS stretch 20"x3 20"x3     Strap 1' Strap 10"x10 20"x3 20"x3   glute stretch 20"x3 20"x3      supine 10"x10 20"x3 20"x3   Anthony stretch w strap       1' 10"x10     PPT 10"x10        5"x20 5"x20   PPT+march             PPT+SLR 5"x15        5"x10 5"x10   glute set +bridge 5"x15        5"x10 5"x10   HL hip abd         rtb 5"x10 rtb 5"x10   S/L hip abd  2x10           PHE                                                    Ther Activity                                       Gait Training             TM test                          Modalities

## 2022-05-02 ENCOUNTER — OFFICE VISIT (OUTPATIENT)
Dept: PHYSICAL THERAPY | Facility: CLINIC | Age: 67
End: 2022-05-02
Payer: COMMERCIAL

## 2022-05-02 DIAGNOSIS — M54.16 LUMBAR RADICULOPATHY: Primary | ICD-10-CM

## 2022-05-02 DIAGNOSIS — M54.50 LUMBAR PAIN: ICD-10-CM

## 2022-05-02 PROCEDURE — 97110 THERAPEUTIC EXERCISES: CPT | Performed by: PHYSICAL THERAPIST

## 2022-05-02 NOTE — PROGRESS NOTES
Daily Note     Today's date: 2022  Patient name: Meryl Padilla  : 1955  MRN: [id number]  Referring provider: Eve Parnell PA-C  Dx:   Encounter Diagnosis     ICD-10-CM    1  Lumbar radiculopathy  M54 16    2  Lumbar pain  M54 50        Start Time: 0915          Subjective: MRI shows slight progression of degenerative changes and stenosis of lumbar which correlates with L3 radicu  Objective: See treatment diary below      Assessment: Decreased interval rest time today and added more hip strengthening  He did well, appropriately challenged  Tolerated treatment well  Patient demonstrated fatigue post treatment and would benefit from continued PT      Plan: Continue per plan of care  Precautions: h/o lumbar pain with R foot numbness, DF weakness  Other factors: n/a  Pt goals: less pain  EPOC: 22  F/u with referring:      HEP:  Access Code: [email or web address]  URL: [email or web address]  Date: 2022  Prepared by: Elias Alpers    Exercises  · Standing Gastroc Stretch on Step - 3 sets - 20 sec hold  · Seated Hamstring Stretch - 3 sets - 20 sec hold  · Prone Quadriceps Stretch with Strap - 3 sets - 20 sec hold  · Sidelying Hip Abduction - 2 sets - 10 reps  · Prone Hip Extension - 2 sets - 10 reps  · Standing Forward Trunk Flexion - 10 reps  · Standing Sidebends - 10 reps  · Standing Lumbar Extension - 10 reps          Manuals    RE  ESTEFANI           Quad, hip flexor release/roller             Posterior chain roller             Quad, hip flexor stretching                                       Neuro Re-Ed                                                    Ther Ex             bike             TM 2:30' on  2' off   1 8 mph  x5 2:30' on  2' off   1 8 mph  x5 2:30' on  1' off   1 8 mph  x5    2' on  2' off   2 mph  x5 2' on  2' off   1 8 mph  x5 2:15' on  2' off   1 8 mph  x5 2:15' on  2' off   1 8 mph  x5   seated Lumbar flex 3 way on pball Lumbar arom  x10 x10    x10      Hip flexor stretch on step             Calf stretch 20"x3 20"x3     1'  20"x3 20"x3   HR             Seated HS stretch 20"x3 20"x3     Strap 1' Strap 10"x10 20"x3 20"x3   glute stretch 20"x3 20"x3 20"x3     supine 10"x10 20"x3 20"x3   Anthony stretch w strap       1' 10"x10     PPT 10"x10        5"x20 5"x20   PPT+march   2x20          PPT+SLR 5"x15        5"x10 5"x10   glute set +bridge 5"x15        5"x10 5"x10   HL hip abd         rtb 5"x10 rtb 5"x10   S/L hip abd  2x10 x15          EMMANUEL   1'          PHE   x15          3way hip   otb x15                                                 Ther Activity                                       Gait Training             TM test                          Modalities

## 2022-05-05 ENCOUNTER — OFFICE VISIT (OUTPATIENT)
Dept: PHYSICAL THERAPY | Facility: CLINIC | Age: 67
End: 2022-05-05
Payer: COMMERCIAL

## 2022-05-05 DIAGNOSIS — M54.16 LUMBAR RADICULOPATHY: Primary | ICD-10-CM

## 2022-05-05 DIAGNOSIS — M54.50 LUMBAR PAIN: ICD-10-CM

## 2022-05-05 PROCEDURE — 97110 THERAPEUTIC EXERCISES: CPT | Performed by: PHYSICAL THERAPIST

## 2022-05-05 NOTE — PROGRESS NOTES
Daily Note     Today's date: 2022  Patient name: Mark Hu  : 1955  MRN: [id number]  Referring provider: Eliane Cain PA-C  Dx:   Encounter Diagnosis     ICD-10-CM    1  Lumbar radiculopathy  M54 16    2  Lumbar pain  M54 50        Start Time: 662          Subjective: Pulled weeds for 2 hrs last night and his butt was killing him from being bent over that long  He took the MRI images to his dr and is waiting for a call to schedule a f/u      Objective: See treatment diary below      Assessment: Slightly increased the speed of his intervals today  Also continued with hip and core strengthening with slight progression from last session  He did well with this  Updated HEP  Tolerated treatment well  Patient demonstrated fatigue post treatment and would benefit from continued PT      Plan: Continue per plan of care  Will hold on PT until he has a f/u with his dr regarding the MRI results  Precautions: h/o lumbar pain with R foot numbness, DF weakness  Other factors: n/a  Pt goals: less pain  EPOC: 22  F/u with referring:      HEP:  Access Code: [email or web address]  URL: [email or web address]  Date: 2022  Prepared by: Ladan Walter    Exercises  · Standing Gastroc Stretch on Step - 3 sets - 20 sec hold  · Standing Forward Trunk Flexion - 10 reps  · Standing Sidebends - 10 reps  · Standing Lumbar Extension - 10 reps  · Standing Hip Extension with Anchored Resistance - 20 reps  · Standing Hip Abduction with Anchored Resistance - 20 reps  · Standing Repeated Hip Flexion with Resistance - 20 reps  · Supine March - 2 sets - 20 reps          Manuals    RE  ESTEFANI           Quad, hip flexor release/roller             Posterior chain roller             Quad, hip flexor stretching                                       Neuro Re-Ed                                                    Ther Ex             bike             TM 2:30' on  2' off   1 8 mph  x5 2:30' on  2' off   1 8 mph  x5 2:30' on  1' off   1 8 mph  x5 2:30' on  1' off   2 0 mph  x5   2' on  2' off   2 mph  x5 2' on  2' off   1 8 mph  x5 2:15' on  2' off   1 8 mph  x5 2:15' on  2' off   1 8 mph  x5   seated Lumbar flex 3 way on pball             Lumbar arom  x10 x10 x10   x10      Hip flexor stretch on step             Calf stretch 20"x3 20"x3  30"ea   1'  20"x3 20"x3   HR             Seated HS stretch 20"x3 20"x3     Strap 1' Strap 10"x10 20"x3 20"x3   glute stretch 20"x3 20"x3 20"x3 20"x3    supine 10"x10 20"x3 20"x3   Anthony stretch w strap       1' 10"x10     PPT 10"x10        5"x20 5"x20   PPT+march   2x20 2x20         PPT+SLR 5"x15        5"x10 5"x10   glute set +bridge 5"x15        5"x10 5"x10   HL hip abd         rtb 5"x10 rtb 5"x10   S/L hip abd  2x10 x15 x20         EMMANUEL   1' 1'         PHE   x15 2x10         3way hip   otb x15 otb x15                                                Ther Activity                                       Gait Training             TM test                          Modalities

## 2022-05-26 ENCOUNTER — CONSULT (OUTPATIENT)
Dept: PAIN MEDICINE | Facility: CLINIC | Age: 67
End: 2022-05-26
Payer: COMMERCIAL

## 2022-05-26 VITALS
WEIGHT: 225 LBS | HEIGHT: 70 IN | DIASTOLIC BLOOD PRESSURE: 88 MMHG | BODY MASS INDEX: 32.21 KG/M2 | HEART RATE: 64 BPM | SYSTOLIC BLOOD PRESSURE: 164 MMHG | TEMPERATURE: 98 F

## 2022-05-26 DIAGNOSIS — M54.16 LUMBAR RADICULOPATHY: ICD-10-CM

## 2022-05-26 DIAGNOSIS — M48.061 LUMBAR FORAMINAL STENOSIS: Primary | ICD-10-CM

## 2022-05-26 DIAGNOSIS — M51.26 LUMBAR DISC HERNIATION: ICD-10-CM

## 2022-05-26 PROBLEM — M19.90 ARTHRITIS: Status: ACTIVE | Noted: 2022-05-26

## 2022-05-26 PROBLEM — H90.3 BILATERAL SENSORINEURAL HEARING LOSS: Status: ACTIVE | Noted: 2022-05-26

## 2022-05-26 PROBLEM — N28.1 RENAL CYST: Status: ACTIVE | Noted: 2022-05-26

## 2022-05-26 PROBLEM — I35.9 AORTIC VALVE DISORDER: Status: ACTIVE | Noted: 2022-05-26

## 2022-05-26 PROBLEM — R26.9 ABNORMAL GAIT: Status: ACTIVE | Noted: 2022-05-26

## 2022-05-26 PROBLEM — N40.0 BENIGN PROSTATIC HYPERPLASIA WITHOUT URINARY OBSTRUCTION: Status: ACTIVE | Noted: 2022-05-26

## 2022-05-26 PROBLEM — E78.5 HYPERLIPIDEMIA: Status: ACTIVE | Noted: 2022-05-26

## 2022-05-26 PROBLEM — M10.9 ACUTE GOUT: Status: ACTIVE | Noted: 2022-05-26

## 2022-05-26 PROCEDURE — 99204 OFFICE O/P NEW MOD 45 MIN: CPT | Performed by: ANESTHESIOLOGY
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
## 2022-06-08 ENCOUNTER — HOSPITAL ENCOUNTER (OUTPATIENT)
Dept: RADIOLOGY | Facility: CLINIC | Age: 67
Discharge: HOME | End: 2022-06-08
Attending: ANESTHESIOLOGY
Payer: COMMERCIAL

## 2022-06-08 VITALS
RESPIRATION RATE: 18 BRPM | DIASTOLIC BLOOD PRESSURE: 95 MMHG | SYSTOLIC BLOOD PRESSURE: 180 MMHG | TEMPERATURE: 97.9 F | HEART RATE: 59 BPM | OXYGEN SATURATION: 94 %

## 2022-06-08 DIAGNOSIS — M48.061 LUMBAR FORAMINAL STENOSIS: ICD-10-CM

## 2022-06-08 DIAGNOSIS — M51.26 LUMBAR DISC HERNIATION: Primary | ICD-10-CM

## 2022-06-08 DIAGNOSIS — M54.16 LUMBAR RADICULOPATHY: ICD-10-CM

## 2022-06-08 DIAGNOSIS — M51.26 LUMBAR DISC HERNIATION: ICD-10-CM

## 2022-06-08 PROCEDURE — A9585 GADOBUTROL INJECTION: HCPCS | Performed by: ANESTHESIOLOGY

## 2022-06-08 PROCEDURE — 64484 NJX AA&/STRD TFRM EPI L/S EA: CPT | Performed by: ANESTHESIOLOGY

## 2022-06-08 PROCEDURE — 64483 NJX AA&/STRD TFRM EPI L/S 1: CPT | Performed by: ANESTHESIOLOGY

## 2022-06-08 RX ORDER — TRAMADOL HYDROCHLORIDE 50 MG/1
50 TABLET ORAL AS NEEDED
COMMUNITY
Start: 2022-02-17 | End: 2022-06-08 | Stop reason: SDUPTHER

## 2022-06-08 RX ORDER — PAPAVERINE HCL 150 MG
15 CAPSULE, EXTENDED RELEASE ORAL ONCE
Status: COMPLETED | OUTPATIENT
Start: 2022-06-08 | End: 2022-06-08

## 2022-06-08 RX ORDER — TRAMADOL HYDROCHLORIDE 50 MG/1
50 TABLET ORAL EVERY 8 HOURS PRN
Qty: 45 TABLET | Refills: 0 | Status: SHIPPED | OUTPATIENT
Start: 2022-06-08

## 2022-06-08 RX ADMIN — DEXAMETHASONE SODIUM PHOSPHATE 15 MG: 10 INJECTION, SOLUTION INTRAMUSCULAR; INTRAVENOUS at 09:48

## 2022-06-08 RX ADMIN — GADOBUTROL 1 ML: 604.72 INJECTION INTRAVENOUS at 09:48
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
## 2022-06-08 NOTE — DISCHARGE INSTR - LAB
Epidural Steroid Injection   WHAT YOU NEED TO KNOW:   An epidural steroid injection (CAMERON) is a procedure to inject steroid medicine into the epidural space  The epidural space is between your spinal cord and vertebrae  Steroids reduce inflammation and fluid buildup in your spine that may be causing pain  You may be given pain medicine along with the steroids  ACTIVITY  Do not drive or operate machinery today  No strenuous activity today - bending, lifting, etc   You may resume normal activites starting tomorrow - start slowly and as tolerated  You may shower today, but no tub baths or hot tubs  You may have numbness for several hours from the local anesthetic  Please use caution and common sense, especially with weight-bearing activities  CARE OF THE INJECTION SITE  If you have soreness or pain, apply ice to the area today (20 minutes on/20 minutes off)  Starting tomorrow, you may use warm, moist heat or ice if needed  You may have an increase or change in your discomfort for 36-48 hours after your treatment  Apply ice and continue with any pain medication you have been prescribed  Notify the [facility] if you have any of the following: redness, drainage, swelling, headache, stiff neck or fever above 100°F     SPECIAL INSTRUCTIONS  Our office will contact you in approximately 7 days for a progress report  MEDICATIONS  Continue to take all routine medications  Our office may have instructed you to hold some medications  As no general anesthesia was used in today's procedure, you should not experience any side effects related to anesthesia  If you have a problem specifically related to your procedure, please call our office at [phone]  Problems not related to your procedure should be directed to your primary care physician

## 2022-06-15 ENCOUNTER — TELEPHONE (OUTPATIENT)
Dept: PAIN MEDICINE | Facility: CLINIC | Age: 67
End: 2022-06-15

## 2022-06-15 NOTE — TELEPHONE ENCOUNTER
1st attempt  Lm to cb with % improvement and pain level.       Left L3 and left L4 TFESI 1 6/8 , 7/1 LESI #2

## 2022-07-01 ENCOUNTER — TELEPHONE (OUTPATIENT)
Dept: RADIOLOGY | Facility: CLINIC | Age: 67
End: 2022-07-01

## 2022-10-24 ENCOUNTER — HOSPITAL ENCOUNTER (EMERGENCY)
Facility: HOSPITAL | Age: 67
Discharge: HOME | End: 2022-10-24
Attending: EMERGENCY MEDICINE
Payer: COMMERCIAL

## 2022-10-24 ENCOUNTER — APPOINTMENT (EMERGENCY)
Dept: NON INVASIVE DIAGNOSTICS | Facility: HOSPITAL | Age: 67
End: 2022-10-24
Payer: COMMERCIAL

## 2022-10-24 VITALS
WEIGHT: 220 LBS | SYSTOLIC BLOOD PRESSURE: 199 MMHG | OXYGEN SATURATION: 97 % | HEIGHT: 70 IN | BODY MASS INDEX: 31.5 KG/M2 | DIASTOLIC BLOOD PRESSURE: 104 MMHG | RESPIRATION RATE: 18 BRPM | HEART RATE: 68 BPM | TEMPERATURE: 97.9 F

## 2022-10-24 DIAGNOSIS — M79.609 PAIN IN LIMB: ICD-10-CM

## 2022-10-24 DIAGNOSIS — M79.604 RIGHT LEG PAIN: Primary | ICD-10-CM

## 2022-10-24 LAB — D DIMER PPP FEU-MCNC: 0.98 UG/ML FEU

## 2022-10-24 PROCEDURE — 99284 EMERGENCY DEPT VISIT MOD MDM: CPT | Performed by: EMERGENCY MEDICINE

## 2022-10-24 PROCEDURE — 93971 EXTREMITY STUDY: CPT | Performed by: SURGERY

## 2022-10-24 PROCEDURE — 85379 FIBRIN DEGRADATION QUANT: CPT | Performed by: EMERGENCY MEDICINE

## 2022-10-24 PROCEDURE — 36415 COLL VENOUS BLD VENIPUNCTURE: CPT

## 2022-10-24 PROCEDURE — 93971 EXTREMITY STUDY: CPT

## 2022-10-25 NOTE — ED PROVIDER NOTES
History  Chief Complaint   Patient presents with   • Leg Pain     Back surgery last Tuesday  States RLE pain since Thursday  Denies swelling  Denies SOB, CP  1/2 tab of oxycontin 0800 with partial relief  States the pain comes and goes depending on his position     75 yom presents for evaluation of right calf pain that started 4 days ago  Patient is approximately 1 week post op from spinal surgery  Denies chest pain, SOB, fever  Denies trauma to leg  Reports spontaneous pain in right calf  Worse with walking and palpation but is able to ambulate  Patient called surgeons office and was advised to come to the ED to rule out DVT  Oxycontin with partial relief  Prior to Admission Medications   Prescriptions Last Dose Informant Patient Reported? Taking?    Colchicine 0 6 MG CAPS  Self Yes No   Sig: as needed    aspirin 81 mg chewable tablet  Self Yes No   Sig: Chew 81 mg daily   atorvastatin (LIPITOR) 10 mg tablet  Self Yes No   Sig: Take 1 tablet by mouth daily   ibuprofen (MOTRIN) 200 mg tablet  Self Yes No   Sig: Take 200 mg by mouth every 6 (six) hours as needed for mild pain   lisinopril (ZESTRIL) 10 mg tablet  Self Yes No   Sig: Take 1 tablet by mouth daily   metoprolol succinate (TOPROL-XL) 50 mg 24 hr tablet  Self Yes No   Sig: Take 50 mg by mouth daily   traMADol (ULTRAM) 50 mg tablet   No No   Sig: Take 1 tablet (50 mg total) by mouth every 8 (eight) hours as needed for severe pain      Facility-Administered Medications: None       Past Medical History:   Diagnosis Date   • Arthritis    • Asthma    • Colon polyp    • Hypercholesterolemia    • Hypertension    • Sleep apnea        Past Surgical History:   Procedure Laterality Date   • BACK SURGERY      plate ,screws   • CATARACT EXTRACTION     • COLONOSCOPY  03/11/2015   • TOTAL KNEE ARTHROPLASTY      BL       Family History   Problem Relation Age of Onset   • Colorectal Cancer Family    • Heart disease Family    • Hypertension Family    • Stroke Family • Thyroid disease Family      I have reviewed and agree with the history as documented  E-Cigarette/Vaping   • E-Cigarette Use Never User      E-Cigarette/Vaping Substances   • Nicotine No    • THC No    • CBD No    • Flavoring No    • Other No    • Unknown No      Social History     Tobacco Use   • Smoking status: Former Smoker     Types: Cigarettes     Quit date: 10/16/1983     Years since quittin 0   • Smokeless tobacco: Never Used   Vaping Use   • Vaping Use: Never used   Substance Use Topics   • Alcohol use: Yes     Comment: Social   • Drug use: Never       Review of Systems   Constitutional: Negative for fever  Musculoskeletal:        Right calf pain   Skin: Negative for color change and wound  All other systems reviewed and are negative  Physical Exam  Physical Exam  Vitals and nursing note reviewed  Constitutional:       General: He is not in acute distress  Appearance: He is well-developed  HENT:      Head: Normocephalic and atraumatic  Right Ear: External ear normal       Left Ear: External ear normal       Nose: Nose normal    Eyes:      General: No scleral icterus  Pulmonary:      Effort: Pulmonary effort is normal  No respiratory distress  Abdominal:      General: There is no distension  Palpations: Abdomen is soft  Musculoskeletal:         General: Tenderness present  No deformity  Normal range of motion  Cervical back: Normal range of motion and neck supple  Comments: Mildly tender in posterior right calf  No overlying skin changes  Compartments soft  2+ DP pulse  No paresthesias  Normal ROM at foot, ankle, knee, hip  Achilles intact  No crepitus  Skin:     General: Skin is warm  Findings: No rash  Neurological:      General: No focal deficit present  Mental Status: He is alert        Gait: Gait normal    Psychiatric:         Mood and Affect: Mood normal          Vital Signs  ED Triage Vitals [10/24/22 1156]   Temperature Pulse Respirations Blood Pressure SpO2   97 9 °F (36 6 °C) 68 18 (!) 199/104 97 %      Temp Source Heart Rate Source Patient Position - Orthostatic VS BP Location FiO2 (%)   Temporal Monitor Sitting Left arm --      Pain Score       7           Vitals:    10/24/22 1156   BP: (!) 199/104   Pulse: 68   Patient Position - Orthostatic VS: Sitting         Visual Acuity      ED Medications  Medications - No data to display    Diagnostic Studies  Results Reviewed     Procedure Component Value Units Date/Time    D-dimer, quantitative [[id number]]  (Abnormal) Collected: 10/24/22 1206    Lab Status: Final result Specimen: Blood from Arm, Left Updated: 10/24/22 1243     D-Dimer, Quant 0 98 ug/ml FEU     Narrative: In the evaluation for possible pulmonary embolism, in the appropriate (Well's Score of 4 or less) patient, the age adjusted d-dimer cutoff for this patient can be calculated as:    Age x 0 01 (in ug/mL) for Age-adjusted D-dimer exclusion threshold for a patient over 50 years  VAS lower limb venous duplex study, unilateral/limited   Final Result by Geo Palm MD (10/24 6384)                 Procedures  Procedures         ED Course                                             MDM  Number of Diagnoses or Management Options  Right leg pain: new and requires workup  Diagnosis management comments: 75 yom with right leg pain s/p surgery 1 week ago  Dimer elevated  Duplex without evidence of DVT per vasc tech  Fu PCP/surgeon  RTED discussed          Amount and/or Complexity of Data Reviewed  Clinical lab tests: reviewed and ordered  Tests in the radiology section of CPT®: reviewed and ordered  Tests in the medicine section of CPT®: reviewed  Decide to obtain previous medical records or to obtain history from someone other than the patient: yes  Review and summarize past medical records: yes        Disposition  Final diagnoses:   Right leg pain     Time reflects when diagnosis was documented in both MDM as applicable and the Disposition within this note     Time User Action Codes Description Comment    10/24/2022  1:38 PM Pedro Dupree Add [M79 609] Pain in limb     10/24/2022  1:54 PM Yessica Salgado Add [T58 896] Right leg pain       ED Disposition     ED Disposition   Discharge    Condition   Stable    Date/Time   Mon Oct 24, 2022  1:54 PM    Comment   Kavon Dong discharge to home/self care  Follow-up Information     Follow up With Specialties Details Why Contact Info Additional Information    Gaurav Biswas MD Family Medicine In 3 days  [facility] [phone]        [address] Emergency Department Emergency Medicine  If symptoms worsen [address] [place], [address]  [address] [facility] Emergency Department, [address], [facility] [address]          Discharge Medication List as of 10/24/2022  1:54 PM      CONTINUE these medications which have NOT CHANGED    Details   aspirin 81 mg chewable tablet Chew 81 mg daily, Historical Med      atorvastatin (LIPITOR) 10 mg tablet Take 1 tablet by mouth daily, Starting Tue 10/29/2013, Historical Med      Colchicine 0 6 MG CAPS as needed , Starting Fri 5/29/2020, Historical Med      ibuprofen (MOTRIN) 200 mg tablet Take 200 mg by mouth every 6 (six) hours as needed for mild pain, Historical Med      lisinopril (ZESTRIL) 10 mg tablet Take 1 tablet by mouth daily, Starting Tue 10/29/2013, Historical Med      metoprolol succinate (TOPROL-XL) 50 mg 24 hr tablet Take 50 mg by mouth daily, Historical Med      traMADol (ULTRAM) 50 mg tablet Take 1 tablet (50 mg total) by mouth every 8 (eight) hours as needed for severe pain, Starting Wed 6/8/2022, Normal             No discharge procedures on file      PDMP Review       Value Time User    PDMP Reviewed  Yes 6/8/2022  9:57 AM Ana Fuentes DO          ED Provider  Electronically Signed by Jenifer Cuevas DO  10/25/22 1705

## 2024-08-15 ENCOUNTER — APPOINTMENT (OUTPATIENT)
Dept: RADIOLOGY | Facility: CLINIC | Age: 69
End: 2024-08-15
Payer: COMMERCIAL

## 2024-08-15 DIAGNOSIS — M25.531 PAIN IN RIGHT WRIST: ICD-10-CM

## 2024-08-15 PROCEDURE — 73100 X-RAY EXAM OF WRIST: CPT

## 2025-02-05 ENCOUNTER — HOSPITAL ENCOUNTER (OUTPATIENT)
Dept: NEUROLOGY | Facility: CLINIC | Age: 70
Discharge: HOME | End: 2025-02-05
Payer: COMMERCIAL

## 2025-02-05 DIAGNOSIS — G56.03 CARPAL TUNNEL SYNDROME, BILATERAL UPPER LIMBS: ICD-10-CM

## 2025-02-05 PROCEDURE — 95886 MUSC TEST DONE W/N TEST COMP: CPT | Performed by: PSYCHIATRY & NEUROLOGY

## 2025-02-05 PROCEDURE — 95912 NRV CNDJ TEST 11-12 STUDIES: CPT | Performed by: PSYCHIATRY & NEUROLOGY

## 2025-05-07 ENCOUNTER — EVALUATION (OUTPATIENT)
Dept: OCCUPATIONAL THERAPY | Facility: CLINIC | Age: 70
End: 2025-05-07
Payer: COMMERCIAL

## 2025-05-07 DIAGNOSIS — M25.532 LEFT WRIST PAIN: Primary | ICD-10-CM

## 2025-05-07 PROCEDURE — 97140 MANUAL THERAPY 1/> REGIONS: CPT | Performed by: OCCUPATIONAL THERAPIST

## 2025-05-07 PROCEDURE — 97110 THERAPEUTIC EXERCISES: CPT | Performed by: OCCUPATIONAL THERAPIST

## 2025-05-07 PROCEDURE — 97165 OT EVAL LOW COMPLEX 30 MIN: CPT | Performed by: OCCUPATIONAL THERAPIST

## 2025-05-07 NOTE — PROGRESS NOTES
OT Evaluation     Today's date: 2025  Patient name: Rafael Newberry  : 1955  MRN: [id number]  Referring provider: Dexter Chaves MD  Dx:   Encounter Diagnosis     ICD-10-CM    1. Left wrist pain  M25.532                      Assessment  Impairments: abnormal or restricted ROM, lacks appropriate home exercise program and pain with function  Functional limitations: Pt. is limited with all ADLs using his L hand.  Pt. is R hand dominant.  He is unable to work, dress himself, grooming.  Symptom irritability: high    Assessment details: Pt. Presents today for evaluation of the L hand s/p CTR and fall.  Pt. Has moderate to severe pain across the hand and wrist.  He has edema and pain which limits all mobility of the hand and wrist.  Pt. Fall exacerbated his pain.  Sensation remains limited in the median N.  He has limited use of his L hand with all ADLs.  Pt. To benefit from skilled hand therapy to reduce symptoms and improve function, regain full ROM and strength to RTW.    Understanding of Dx/Px/POC: good     Prognosis: good    Goals  STG( 4 visits)  1. Compliant with HEP/splint.  2. rEduce edema in the L hand to WNLs for ROM  3. rEduce pain to less than 6/10 for function.  LTG( 12 visits or discharge)  1. Regain full AROM of the hand and wrist for function.  2. Pain free L hand use with ADLS.  3. L  and pinch strength WFLs for RTW   4. Improve FOTO score to predicted outcome or greater.      Plan  Patient would benefit from: skilled occupational therapy and OT eval    Planned therapy interventions: IASTM, joint mobilization, home exercise program, graded exercise, therapeutic exercise, therapeutic activities, functional ROM exercises, fine motor coordination training and orthotic fitting/training    Frequency: 2x week  Duration in weeks: 6  Plan of Care beginning date: 2025  Plan of Care expiration date: 2025  Treatment plan discussed with: patient        Subjective Evaluation    History of  Present Illness  Mechanism of injury: Pt. Underwent a L CTR on 25.  Prior to surgery pt. Fell injuring his wrist.  Xrays were done post op which were negative for any fx.  Pt. Has an increase in pain and limited ROM post op.    Quality of life: good    Patient Goals  Patient goals for therapy: decreased edema, decreased pain, increased motion, return to work, independence with ADLs/IADLs and increased strength    Pain  Current pain ratin  At worst pain ratin  Quality: discomfort, tight, throbbing and dull ache  Relieving factors: ice  Aggravating factors: nothing  Progression: no change    Social Support  Lives in: multiple-level home  Lives with: spouse    Employment status: working (maintenence)  Hand dominance: right      Diagnostic Tests  X-ray: normal  Treatments  Current treatment: occupational therapy        Objective     Tenderness     Additional Tenderness Details  Diffuse tenderness over the hand and wrist.    Neurological Testing     Sensation     Wrist/Hand   Left   Diminished: light touch    Active Range of Motion     Left Elbow   Forearm supination: 35 degrees   Forearm pronation: 40 degrees     Left Wrist   Wrist flexion: 0 degrees with pain  Wrist extension: 20 degrees with pain  Radial deviation: 0 degrees with pain  Ulnar deviation: 0 degrees with pain      Left Thumb     Opposition: Pt. Has limited AROM of all digits due to pain and swelling.  Pt. Able  to close fist to about 8cm to DPC.    Opposes to 2nd digit with effort    Strength/Myotome Testing     Left Wrist/Hand      (2nd hand position)     Trial 1: 0    Thumb Strength  Key/Lateral Pinch     Trial 1: 0  Tip/Two-Point Pinch     Trial 1: 0  Palmar/Three-Point Pinch     Trial 1: 0    Swelling     Left Wrist/Hand   Circumference MCP: 22 cm  Circumference wrist: 19.3 cm            Daily Treatment Diary     Precautions: Standard  CO-MORBIDITIES:DJD wrist  HEP ACCESS CODE: wrist ROM, TGES  FOTO Completed On: 25    POC Expires  Reeval for Medicare to be completed  Auth Expiration Date PT/OT/STVisit Limit   7/6/25 By visit 10  BOMN    Completed on visit                  Auth Status DATE 5/7        NA Visit # 1         Remaining         MANUAL THERAPY                  retrograde 3m        STM CT/ mid carpal 3m        Intrinsic/extrinsic stretches 2m        CT stretch 2m        Scar mob 2m        THERAPEUTIC EXERCISE HEP Wrist ROM/TGE        Wrist PROM  2m        Wrist AROM          Hook/   Wooden pegs        gripping          P/S AROM                                                                                                              NEUROMUSCULAR REEDUCATION                                                                                                                                                       THERAPEUTIC ACTIVITY          Peg           opposition                                                                                MODALITIES         MHP L 6m

## 2025-05-07 NOTE — LETTER
May 7, 2025    Dexter Chaves MD  [address] [facility] [address]    Patient: Rafael Newberry   YOB: 1955   Date of Visit: 2025     Encounter Diagnosis     ICD-10-CM    1. Left wrist pain  M25.532           Dear Dr. Dexter Chaves MD:    Thank you for your recent referral of Rafael Newberry. Please review the attached evaluation summary from Rafael's recent visit.     Please verify that you agree with the plan of care by signing the attached order.     If you have any questions or concerns, please do not hesitate to call.     I sincerely appreciate the opportunity to share in the care of one of your patients and hope to have another opportunity to work with you in the near future.     Sincerely,    Kassie Amin, OT      Referring Provider:     I certify that I have read the below Plan of Care and certify the need for these services furnished under this plan of treatment while under my care.                    Dexter Chaves MD  [address] [facility] [address]  Via Fax: [phone]        OT Evaluation     Today's date: 2025  Patient name: Rafael Newberry  : 1955  MRN: [id number]  Referring provider: Dexter Chaves MD  Dx:   Encounter Diagnosis     ICD-10-CM    1. Left wrist pain  M25.532                      Assessment  Impairments: abnormal or restricted ROM, lacks appropriate home exercise program and pain with function  Functional limitations: Pt. is limited with all ADLs using his L hand.  Pt. is R hand dominant.  He is unable to work, dress himself, grooming.  Symptom irritability: high    Assessment details: Pt. Presents today for evaluation of the L hand s/p CTR and fall.  Pt. Has moderate to severe pain across the hand and wrist.  He has edema and pain which limits all mobility of the hand and wrist.  Pt. Fall exacerbated his pain.  Sensation remains limited in the median N.  He has limited use of his L hand with all ADLs.  Pt. To benefit from skilled hand  therapy to reduce symptoms and improve function, regain full ROM and strength to RTW.    Understanding of Dx/Px/POC: good     Prognosis: good    Goals  STG( 4 visits)  1. Compliant with HEP/splint.  2. rEduce edema in the L hand to WNLs for ROM  3. rEduce pain to less than 6/10 for function.  LTG( 12 visits or discharge)  1. Regain full AROM of the hand and wrist for function.  2. Pain free L hand use with ADLS.  3. L  and pinch strength WFLs for RTW   4. Improve FOTO score to predicted outcome or greater.      Plan  Patient would benefit from: skilled occupational therapy and OT eval    Planned therapy interventions: IASTM, joint mobilization, home exercise program, graded exercise, therapeutic exercise, therapeutic activities, functional ROM exercises, fine motor coordination training and orthotic fitting/training    Frequency: 2x week  Duration in weeks: 6  Plan of Care beginning date: 2025  Plan of Care expiration date: 2025  Treatment plan discussed with: patient        Subjective Evaluation    History of Present Illness  Mechanism of injury: Pt. Underwent a L CTR on 25.  Prior to surgery pt. Fell injuring his wrist.  Xrays were done post op which were negative for any fx.  Pt. Has an increase in pain and limited ROM post op.    Quality of life: good    Patient Goals  Patient goals for therapy: decreased edema, decreased pain, increased motion, return to work, independence with ADLs/IADLs and increased strength    Pain  Current pain ratin  At worst pain ratin  Quality: discomfort, tight, throbbing and dull ache  Relieving factors: ice  Aggravating factors: nothing  Progression: no change    Social Support  Lives in: multiple-level home  Lives with: spouse    Employment status: working (maintenence)  Hand dominance: right      Diagnostic Tests  X-ray: normal  Treatments  Current treatment: occupational therapy        Objective     Tenderness     Additional Tenderness Details  Diffuse  tenderness over the hand and wrist.    Neurological Testing     Sensation     Wrist/Hand   Left   Diminished: light touch    Active Range of Motion     Left Elbow   Forearm supination: 35 degrees   Forearm pronation: 40 degrees     Left Wrist   Wrist flexion: 0 degrees with pain  Wrist extension: 20 degrees with pain  Radial deviation: 0 degrees with pain  Ulnar deviation: 0 degrees with pain      Left Thumb     Opposition: Pt. Has limited AROM of all digits due to pain and swelling.  Pt. Able  to close fist to about 8cm to DPC.    Opposes to 2nd digit with effort    Strength/Myotome Testing     Left Wrist/Hand      (2nd hand position)     Trial 1: 0    Thumb Strength  Key/Lateral Pinch     Trial 1: 0  Tip/Two-Point Pinch     Trial 1: 0  Palmar/Three-Point Pinch     Trial 1: 0    Swelling     Left Wrist/Hand   Circumference MCP: 22 cm  Circumference wrist: 19.3 cm            Daily Treatment Diary     Precautions: Standard  CO-MORBIDITIES:DJD wrist  HEP ACCESS CODE: wrist ROM, TGES  FOTO Completed On: 5/7/25    POC Expires Reeval for Medicare to be completed  Auth Expiration Date PT/OT/STVisit Limit   7/6/25 By visit 10  BOMN    Completed on visit                  Auth Status DATE 5/7        NA Visit # 1         Remaining         MANUAL THERAPY                  retrograde 3m        STM CT/ mid carpal 3m        Intrinsic/extrinsic stretches 2m        CT stretch 2m        Scar mob 2m        THERAPEUTIC EXERCISE HEP Wrist ROM/TGE        Wrist PROM  2m        Wrist AROM          Hook/   Wooden pegs        gripping          P/S AROM                                                                                                              NEUROMUSCULAR REEDUCATION                                                                                                                                                       THERAPEUTIC ACTIVITY          Peg           opposition                                                                                 MODALITIES         MHP L 6m

## 2025-05-12 ENCOUNTER — OFFICE VISIT (OUTPATIENT)
Dept: OCCUPATIONAL THERAPY | Facility: CLINIC | Age: 70
End: 2025-05-12
Attending: ORTHOPAEDIC SURGERY
Payer: COMMERCIAL

## 2025-05-12 DIAGNOSIS — M25.532 LEFT WRIST PAIN: Primary | ICD-10-CM

## 2025-05-12 PROCEDURE — 97140 MANUAL THERAPY 1/> REGIONS: CPT | Performed by: OCCUPATIONAL THERAPIST

## 2025-05-12 PROCEDURE — 97110 THERAPEUTIC EXERCISES: CPT | Performed by: OCCUPATIONAL THERAPIST

## 2025-05-12 NOTE — PROGRESS NOTES
Daily Note     Today's date: 2025  Patient name: Rafael Newberry  : 1955  MRN: [id number]  Referring provider: Dexter Chaves MD  Dx:   Encounter Diagnosis     ICD-10-CM    1. Left wrist pain  M25.532                      Subjective: It still hurts a lot.      Objective: See treatment diary below      Assessment: Tolerated treatment fair. Patient  has continued pain 5/10 with movement.  He is hypersensitive to touch and guarded with his movement.  Limited with wrist and digit ROM.       Plan: Continue per plan of care.      Daily Treatment Diary     Precautions: Standard  CO-MORBIDITIES:DJD wrist  HEP ACCESS CODE: wrist ROM, TGES  FOTO Completed On: 25    POC Expires Reeval for Medicare to be completed  Auth Expiration Date PT/OT/STVisit Limit   25 By visit 10  BOMN    Completed on visit                  Auth Status DATE        NA Visit # 1 2        Remaining         MANUAL THERAPY                  retrograde 3m 3m       STM CT/ mid carpal 3m 3m       Intrinsic/extrinsic stretches 2m 2m       CT stretch 2m 2m       Scar mob 2m 2m       THERAPEUTIC EXERCISE HEP Wrist ROM/TGE        Wrist PROM  2m 2m       Wrist AROM   Cone 3x10       Hook/   Wooden pegs Wooden pegs       gripping          P/S AROM                                                                                                              NEUROMUSCULAR REEDUCATION                                                                                                                                                       THERAPEUTIC ACTIVITY          Peg           opposition                                                                                MODALITIES         MHP L 6m 6m

## 2025-05-14 ENCOUNTER — OFFICE VISIT (OUTPATIENT)
Dept: OCCUPATIONAL THERAPY | Facility: CLINIC | Age: 70
End: 2025-05-14
Attending: ORTHOPAEDIC SURGERY
Payer: COMMERCIAL

## 2025-05-14 DIAGNOSIS — M25.532 LEFT WRIST PAIN: Primary | ICD-10-CM

## 2025-05-14 PROCEDURE — 97110 THERAPEUTIC EXERCISES: CPT | Performed by: OCCUPATIONAL THERAPIST

## 2025-05-14 PROCEDURE — 97140 MANUAL THERAPY 1/> REGIONS: CPT | Performed by: OCCUPATIONAL THERAPIST

## 2025-05-14 NOTE — PROGRESS NOTES
Daily Note     Today's date: 2025  Patient name: Rafael Newberry  : 1955  MRN: [id number]  Referring provider: Dexter Chaves MD  Dx:   Encounter Diagnosis     ICD-10-CM    1. Left wrist pain  M25.532                      Subjective: My hand swelled up like a balloon again.      Objective: See treatment diary below      Assessment: Tolerated treatment fair. Patient continues with pain and increased swelling in the hand.  He is very guarded with moving his digits.  Lack of mobility and swelling causing an increase in stiffness and limited ROM.  Pt. Hand swelling more with use of the hand.  Pt. Is limited with progression of therapy due to his symptoms.    Plan: Continue per plan of care.      Daily Treatment Diary     Precautions: Standard  CO-MORBIDITIES:DJD wrist  HEP ACCESS CODE: wrist ROM, TGES  FOTO Completed On: 25    POC Expires Reeval for Medicare to be completed  Auth Expiration Date PT/OT/STVisit Limit   25 By visit 10  BOMN    Completed on visit                  Auth Status DATE       NA Visit # 1 2 3       Remaining         MANUAL THERAPY                  retrograde 3m 3m 3m      STM CT/ mid carpal 3m 3m 3m      Intrinsic/extrinsic stretches 2m 2m 2m      CT stretch 2m 2m 2m      Scar mob 2m 2m 2m      THERAPEUTIC EXERCISE HEP Wrist ROM/TGE        Wrist PROM  2m 2m 2m      Wrist AROM   Cone 3x10 Cone 3x10      Hook/   Wooden pegs Wooden pegs Wooden pegs      gripping    YPW 2x30      P/S AROM                                                                                                              NEUROMUSCULAR REEDUCATION                                                                                                                                                       THERAPEUTIC ACTIVITY          Peg           opposition                                                                                MODALITIES         MHP L 6m 6m 6m

## 2025-05-19 ENCOUNTER — OFFICE VISIT (OUTPATIENT)
Dept: OCCUPATIONAL THERAPY | Facility: CLINIC | Age: 70
End: 2025-05-19
Attending: ORTHOPAEDIC SURGERY
Payer: COMMERCIAL

## 2025-05-19 DIAGNOSIS — M25.532 LEFT WRIST PAIN: Primary | ICD-10-CM

## 2025-05-19 PROCEDURE — 97110 THERAPEUTIC EXERCISES: CPT | Performed by: OCCUPATIONAL THERAPIST

## 2025-05-19 PROCEDURE — 97140 MANUAL THERAPY 1/> REGIONS: CPT | Performed by: OCCUPATIONAL THERAPIST

## 2025-05-19 NOTE — PROGRESS NOTES
Daily Note     Today's date: 2025  Patient name: Rafael Newberry  : 1955  MRN: [id number]  Referring provider: Dexter Chaves MD  Dx:   Encounter Diagnosis     ICD-10-CM    1. Left wrist pain  M25.532                      Subjective: My hand is still swollen.      Objective: See treatment diary below      Assessment: Tolerated treatment fair. Patient continues with swelling and pain in his hand.  Digit mobility is limited due to swelling.  Recommended pt. Get a compression glove to wear.      Plan: Continue per plan of care.      Daily Treatment Diary     Precautions: Standard  CO-MORBIDITIES:DJD wrist  HEP ACCESS CODE: wrist ROM, TGES  FOTO Completed On: 25    POC Expires Reeval for Medicare to be completed  Auth Expiration Date PT/OT/STVisit Limit   25 By visit 10  BOMN    Completed on visit                  Auth Status DATE      NA Visit # 1 2 3 4      Remaining         MANUAL THERAPY                  retrograde 3m 3m 3m 3m     STM CT/ mid carpal 3m 3m 3m 3m     Intrinsic/extrinsic stretches 2m 2m 2m 2m     CT stretch 2m 2m 2m 2m     Scar mob 2m 2m 2m 2m     THERAPEUTIC EXERCISE HEP Wrist ROM/TGE        Wrist PROM  2m 2m 2m 2m     Wrist AROM   Cone 3x10 Cone 3x10 #1 3x10     Hook/   Wooden pegs Wooden pegs Wooden pegs Wooden pegs     gripping    YPW 2x30 YPW 2x30     P/S AROM     Y 3x10                                                                                                         NEUROMUSCULAR REEDUCATION                                                                                                                                                       THERAPEUTIC ACTIVITY          Peg           opposition                                                                                MODALITIES         MHP L 6m 6m 6m 6m     CP post    5m

## 2025-05-22 ENCOUNTER — OFFICE VISIT (OUTPATIENT)
Dept: OCCUPATIONAL THERAPY | Facility: CLINIC | Age: 70
End: 2025-05-22
Attending: ORTHOPAEDIC SURGERY
Payer: COMMERCIAL

## 2025-05-22 DIAGNOSIS — M25.532 LEFT WRIST PAIN: Primary | ICD-10-CM

## 2025-05-22 PROCEDURE — 97110 THERAPEUTIC EXERCISES: CPT | Performed by: OCCUPATIONAL THERAPIST

## 2025-05-22 PROCEDURE — 97140 MANUAL THERAPY 1/> REGIONS: CPT | Performed by: OCCUPATIONAL THERAPIST

## 2025-05-22 NOTE — PROGRESS NOTES
Daily Note     Today's date: 2025  Patient name: Rafael Newberry  : 1955  MRN: [id number]  Referring provider: Dexter Chaves MD  Dx:   Encounter Diagnosis     ICD-10-CM    1. Left wrist pain  M25.532                      Subjective: I am taking meloxican, the doctor just prescribed it.      Objective: See treatment diary below      Assessment: Tolerated treatment fair. Patient noted with small improvement with swelling and hand ROM.  He as started taking medication (Meloxican) and is wearing a compression glove at home per my recommendation.  He has reported small improvements with functional use of his hand with gripping and pinching.      Plan: Continue per plan of care.      Daily Treatment Diary     Precautions: Standard  CO-MORBIDITIES:DJD wrist  HEP ACCESS CODE: wrist ROM, TGES  FOTO Completed On: 25    POC Expires Reeval for Medicare to be completed  Auth Expiration Date PT/OT/STVisit Limit   25 By visit 10  BOMN    Completed on visit                  Auth Status DATE     NA Visit # 1 2 3 4 5     Remaining         MANUAL THERAPY                  retrograde 3m 3m 3m 3m 3m    STM CT/ mid carpal 3m 3m 3m 3m 3m    Intrinsic/extrinsic stretches 2m 2m 2m 2m 2m    CT stretch 2m 2m 2m 2m 2m    Scar mob 2m 2m 2m 2m 2m    THERAPEUTIC EXERCISE HEP Wrist ROM/TGE        Wrist PROM  2m 2m 2m 2m 2m    Wrist AROM   Cone 3x10 Cone 3x10 #1 3x10 #1 3x10    Hook/   Wooden pegs Wooden pegs Wooden pegs Wooden pegs Wooden pegs    gripping    YPW 2x30 YPW 2x30 YPW 2x30    P/S AROM     Y 3x10 Y 3x10    MFR roller- self      3m                                                                                              NEUROMUSCULAR REEDUCATION                                                                                                                                                       THERAPEUTIC ACTIVITY          Peg       x30    opposition                                                                                 MODALITIES         MHP L 6m 6m 6m 6m 6m    CP post    5m 5m

## 2025-05-28 ENCOUNTER — APPOINTMENT (OUTPATIENT)
Dept: OCCUPATIONAL THERAPY | Facility: CLINIC | Age: 70
End: 2025-05-28
Attending: ORTHOPAEDIC SURGERY
Payer: COMMERCIAL

## 2025-05-30 ENCOUNTER — OFFICE VISIT (OUTPATIENT)
Dept: OCCUPATIONAL THERAPY | Facility: CLINIC | Age: 70
End: 2025-05-30
Attending: ORTHOPAEDIC SURGERY
Payer: COMMERCIAL

## 2025-05-30 DIAGNOSIS — M25.532 LEFT WRIST PAIN: Primary | ICD-10-CM

## 2025-05-30 PROCEDURE — 97110 THERAPEUTIC EXERCISES: CPT | Performed by: OCCUPATIONAL THERAPIST

## 2025-05-30 PROCEDURE — 97140 MANUAL THERAPY 1/> REGIONS: CPT | Performed by: OCCUPATIONAL THERAPIST

## 2025-05-30 NOTE — PROGRESS NOTES
Daily Note     Today's date: 2025  Patient name: Rafael Newberry  : 1955  MRN: [id number]  Referring provider: Dexter Chaves MD  Dx:   Encounter Diagnosis     ICD-10-CM    1. Left wrist pain  M25.532                      Subjective: I can use it a little more.      Objective: See treatment diary below      Assessment: Tolerated treatment fair. Patient continues with hand stiffness and limited strength of the L hand with ADLs. Compression glove being worn at night.    Plan: Continue per plan of care.      Daily Treatment Diary     Precautions: Standard  CO-MORBIDITIES:DJD wrist  HEP ACCESS CODE: wrist ROM, TGES  FOTO Completed On: 25    POC Expires Reeval for Medicare to be completed  Auth Expiration Date PT/OT/STVisit Limit   25 By visit 10  BOMN    Completed on visit                  Auth Status DATE    NA Visit # 1 2 3 4 5 6    Remaining         MANUAL THERAPY                  retrograde 3m 3m 3m 3m 3m 3m   STM CT/ mid carpal 3m 3m 3m 3m 3m 3m   Intrinsic/extrinsic stretches 2m 2m 2m 2m 2m 2m   CT stretch 2m 2m 2m 2m 2m 2m   Scar mob 2m 2m 2m 2m 2m 2m   THERAPEUTIC EXERCISE HEP Wrist ROM/TGE        Wrist PROM  2m 2m 2m 2m 2m 2m   Wrist AROM   Cone 3x10 Cone 3x10 #1 3x10 #1 3x10 #2 3x10   Hook/   Wooden pegs Wooden pegs Wooden pegs Wooden pegs Wooden pegs Wooden pegs   gripping    YPW 2x30 YPW 2x30 YPW 2x30 RPW 2x30   P/S AROM     Y 3x10 Y 3x10 R 3x10   MFR roller- self      3m    Clips key pinch       R                                                                                    NEUROMUSCULAR REEDUCATION                                                                                                                                                       THERAPEUTIC ACTIVITY          Peg       x30    opposition                                                                                MODALITIES         MHP L 6m 6m 6m 6m 6m    CP post    5m 5m

## 2025-06-02 ENCOUNTER — OFFICE VISIT (OUTPATIENT)
Dept: OCCUPATIONAL THERAPY | Facility: CLINIC | Age: 70
End: 2025-06-02
Attending: ORTHOPAEDIC SURGERY
Payer: COMMERCIAL

## 2025-06-02 DIAGNOSIS — M25.532 LEFT WRIST PAIN: Primary | ICD-10-CM

## 2025-06-02 PROCEDURE — 97110 THERAPEUTIC EXERCISES: CPT | Performed by: OCCUPATIONAL THERAPIST

## 2025-06-02 PROCEDURE — 97140 MANUAL THERAPY 1/> REGIONS: CPT | Performed by: OCCUPATIONAL THERAPIST

## 2025-06-02 NOTE — PROGRESS NOTES
Daily Note     Today's date: 2025  Patient name: Rafael Newberry  : 1955  MRN: [id number]  Referring provider: Dexter Chaves MD  Dx:   Encounter Diagnosis     ICD-10-CM    1. Left wrist pain  M25.532                      Subjective: I can use it a little more.      Objective: See treatment diary below      Assessment: Tolerated treatment fair. Patient has continued swelling in the hand.  He has improved movement of the digits and functional use.      Plan: Continue per plan of care.      Daily Treatment Diary     Precautions: Standard  CO-MORBIDITIES:DJD wrist  HEP ACCESS CODE: wrist ROM, TGES  FOTO Completed On: 25    POC Expires Reeval for Medicare to be completed  Auth Expiration Date PT/OT/STVisit Limit   25 By visit 10  BOMN    Completed on visit                  Auth Status DATE  6   NA Visit # 1 2 3 4 5 6 7    Remaining          MANUAL THERAPY                    retrograde 3m 3m 3m 3m 3m 3m 3m   STM CT/ mid carpal 3m 3m 3m 3m 3m 3m 3m   Intrinsic/extrinsic stretches 2m 2m 2m 2m 2m 2m 2m   CT stretch 2m 2m 2m 2m 2m 2m 2m   Scar mob 2m 2m 2m 2m 2m 2m 2m   THERAPEUTIC EXERCISE HEP Wrist ROM/TGE         Wrist PROM  2m 2m 2m 2m 2m 2m 2m   Wrist AROM   Cone 3x10 Cone 3x10 #1 3x10 #1 3x10 #2 3x10 #2 3x10   Hook/   Wooden pegs Wooden pegs Wooden pegs Wooden pegs Wooden pegs Wooden pegs Wooden pegs   gripping    YPW 2x30 YPW 2x30 YPW 2x30 RPW 2x30 RPW 2x30   P/S AROM     Y 3x10 Y 3x10 R 3x10 R 3x10   MFR roller- self      3m     Clips key pinch       R  R                                                                                           NEUROMUSCULAR REEDUCATION                                                                                                                                                                      THERAPEUTIC ACTIVITY           Peg       x30     opposition                                                                                         MODALITIES          MHP L 6m 6m 6m 6m 6m  6m   CP post    5m 5m  5m

## 2025-06-04 ENCOUNTER — OFFICE VISIT (OUTPATIENT)
Dept: OCCUPATIONAL THERAPY | Facility: CLINIC | Age: 70
End: 2025-06-04
Attending: ORTHOPAEDIC SURGERY
Payer: COMMERCIAL

## 2025-06-04 DIAGNOSIS — M25.532 LEFT WRIST PAIN: Primary | ICD-10-CM

## 2025-06-04 PROCEDURE — 97140 MANUAL THERAPY 1/> REGIONS: CPT | Performed by: OCCUPATIONAL THERAPIST

## 2025-06-04 PROCEDURE — 97110 THERAPEUTIC EXERCISES: CPT | Performed by: OCCUPATIONAL THERAPIST

## 2025-06-04 NOTE — PROGRESS NOTES
Daily Note     Today's date: 2025  Patient name: Rafael Newberry  : 1955  MRN: [id number]  Referring provider: Dexter Chaves MD  Dx:   Encounter Diagnosis     ICD-10-CM    1. Left wrist pain  M25.532                      Subjective: The swelling is holding me back.      Objective: See treatment diary below      Assessment: Tolerated treatment fair. Patient has improved ROM, his swelling continues to limit his hand use.    Plan: Continue per plan of care.      Daily Treatment Diary     Precautions: Standard  CO-MORBIDITIES:DJD wrist  HEP ACCESS CODE: wrist ROM, TGES  FOTO Completed On: 25    POC Expires Reeval for Medicare to be completed  Auth Expiration Date PT/OT/STVisit Limit   25 By visit 10  BOMN    Completed on visit              Auth Status DATE    NA Visit # 8     6 7    Remaining          MANUAL THERAPY                    retrograde 3m 3m 3m 3m 3m 3m 3m   STM CT/ mid carpal 3m 3m 3m 3m 3m 3m 3m   Intrinsic/extrinsic stretches 2m 2m 2m 2m 2m 2m 2m   CT stretch 2m 2m 2m 2m 2m 2m 2m   Scar mob 2m 2m 2m 2m 2m 2m 2m   THERAPEUTIC EXERCISE HEP          Wrist PROM  2m     2m 2m   Wrist AROM  #2 3x10     #2 3x10 #2 3x10   Hook/   Wooden pegs     Wooden pegs Wooden pegs   gripping  RPW 2x30     RPW 2x30 RPW 2x30   P/S AROM  R 3x10     R 3x10 R 3x10   MFR roller- self  3m         Clips key pinch       R  R                                                                                           NEUROMUSCULAR REEDUCATION                                                                                                                                                                      THERAPEUTIC ACTIVITY           Peg       x30     opposition                                                                                        MODALITIES          MHP L 6m 6m 6m 6m 6m  6m   CP post    5m 5m  5m                   Auth Status DATE    NA  Visit # 1 2 3 4 5 6 7    Remaining          MANUAL THERAPY                    retrograde 3m 3m 3m 3m 3m 3m 3m   STM CT/ mid carpal 3m 3m 3m 3m 3m 3m 3m   Intrinsic/extrinsic stretches 2m 2m 2m 2m 2m 2m 2m   CT stretch 2m 2m 2m 2m 2m 2m 2m   Scar mob 2m 2m 2m 2m 2m 2m 2m   THERAPEUTIC EXERCISE HEP Wrist ROM/TGE         Wrist PROM  2m 2m 2m 2m 2m 2m 2m   Wrist AROM   Cone 3x10 Cone 3x10 #1 3x10 #1 3x10 #2 3x10 #2 3x10   Hook/   Wooden pegs Wooden pegs Wooden pegs Wooden pegs Wooden pegs Wooden pegs Wooden pegs   gripping    YPW 2x30 YPW 2x30 YPW 2x30 RPW 2x30 RPW 2x30   P/S AROM     Y 3x10 Y 3x10 R 3x10 R 3x10   MFR roller- self      3m     Clips key pinch       R  R                                                                                           NEUROMUSCULAR REEDUCATION                                                                                                                                                                      THERAPEUTIC ACTIVITY           Peg       x30     opposition                                                                                        MODALITIES          MHP L 6m 6m 6m 6m 6m  6m   CP post    5m 5m  5m

## 2025-06-09 ENCOUNTER — OFFICE VISIT (OUTPATIENT)
Dept: OCCUPATIONAL THERAPY | Facility: CLINIC | Age: 70
End: 2025-06-09
Attending: ORTHOPAEDIC SURGERY
Payer: COMMERCIAL

## 2025-06-09 DIAGNOSIS — M25.532 LEFT WRIST PAIN: Primary | ICD-10-CM

## 2025-06-09 PROCEDURE — 97110 THERAPEUTIC EXERCISES: CPT | Performed by: OCCUPATIONAL THERAPIST

## 2025-06-09 PROCEDURE — 97140 MANUAL THERAPY 1/> REGIONS: CPT | Performed by: OCCUPATIONAL THERAPIST

## 2025-06-09 NOTE — PROGRESS NOTES
Daily Note     Today's date: 2025  Patient name: Rafael Newberry  : 1955  MRN: [id number]  Referring provider: Dexter Chaves MD  Dx:   Encounter Diagnosis     ICD-10-CM    1. Left wrist pain  M25.532                      Subjective: I still can't hold my phone or a bar of soap.      Objective: See treatment diary below      Assessment: Tolerated treatment fair. Patient has developed olecranon bursitis on the L elbow now.  His hand edema is less today.    Plan: Continue per plan of care.      Daily Treatment Diary     Precautions: Standard  CO-MORBIDITIES:DJD wrist  HEP ACCESS CODE: wrist ROM, TGES  FOTO Completed On: 25    POC Expires Reeval for Medicare to be completed  Auth Expiration Date PT/OT/STVisit Limit   25 By visit 10  BOMN    Completed on visit              Auth Status DATE    NA Visit # 8 9    6 7    Remaining          MANUAL THERAPY                    retrograde 3m 3m 3m 3m 3m 3m 3m   STM CT/ mid carpal 3m 3m 3m 3m 3m 3m 3m   Intrinsic/extrinsic stretches 2m 2m 2m 2m 2m 2m 2m   CT stretch 2m 2m 2m 2m 2m 2m 2m   Scar mob 2m 2m 2m 2m 2m 2m 2m   THERAPEUTIC EXERCISE HEP          Wrist PROM  2m 2m    2m 2m   Wrist AROM  #2 3x10 #2 3x10    #2 3x10 #2 3x10   Hook/   Wooden pegs Wooden pegs w G clip    Wooden pegs Wooden pegs   gripping  RPW 2x30 GPW 2x30    RPW 2x30 RPW 2x30   P/S AROM  R 3x10 G 3x10    R 3x10 R 3x10   MFR roller- self  3m         Clips key pinch       R  R   Handhelper w blocks   2 red 1 set        Ball lifts   R 3x10                                                                          NEUROMUSCULAR REEDUCATION                                                                                                                                                                      THERAPEUTIC ACTIVITY           Peg       x30     opposition                                                                                        MODALITIES           MHP L 6m 6m 6m 6m 6m  6m   CP post    5m 5m  5m                   Auth Status DATE 5/7 5/12 5/14 5/19 5/22 5/30 6/2   NA Visit # 1 2 3 4 5 6 7    Remaining          MANUAL THERAPY                    retrograde 3m 3m 3m 3m 3m 3m 3m   STM CT/ mid carpal 3m 3m 3m 3m 3m 3m 3m   Intrinsic/extrinsic stretches 2m 2m 2m 2m 2m 2m 2m   CT stretch 2m 2m 2m 2m 2m 2m 2m   Scar mob 2m 2m 2m 2m 2m 2m 2m   THERAPEUTIC EXERCISE HEP Wrist ROM/TGE         Wrist PROM  2m 2m 2m 2m 2m 2m 2m   Wrist AROM   Cone 3x10 Cone 3x10 #1 3x10 #1 3x10 #2 3x10 #2 3x10   Hook/   Wooden pegs Wooden pegs Wooden pegs Wooden pegs Wooden pegs Wooden pegs Wooden pegs   gripping    YPW 2x30 YPW 2x30 YPW 2x30 RPW 2x30 RPW 2x30   P/S AROM     Y 3x10 Y 3x10 R 3x10 R 3x10   MFR roller- self      3m     Clips key pinch       R  R                                                                                           NEUROMUSCULAR REEDUCATION                                                                                                                                                                      THERAPEUTIC ACTIVITY           Peg       x30     opposition                                                                                        MODALITIES          MHP L 6m 6m 6m 6m 6m  6m   CP post    5m 5m  5m

## 2025-06-11 ENCOUNTER — OFFICE VISIT (OUTPATIENT)
Dept: OCCUPATIONAL THERAPY | Facility: CLINIC | Age: 70
End: 2025-06-11
Attending: ORTHOPAEDIC SURGERY
Payer: COMMERCIAL

## 2025-06-11 DIAGNOSIS — M25.532 LEFT WRIST PAIN: Primary | ICD-10-CM

## 2025-06-11 PROCEDURE — 97110 THERAPEUTIC EXERCISES: CPT | Performed by: OCCUPATIONAL THERAPIST

## 2025-06-11 PROCEDURE — 97140 MANUAL THERAPY 1/> REGIONS: CPT | Performed by: OCCUPATIONAL THERAPIST

## 2025-06-11 NOTE — PROGRESS NOTES
Daily Note     Today's date: 2025  Patient name: Rafael Newberry  : 1955  MRN: [id number]  Referring provider: Dexter Chaves MD  Dx:   Encounter Diagnosis     ICD-10-CM    1. Left wrist pain  M25.532                      Subjective: I am using it more.      Objective: See treatment diary below      Assessment: Tolerated treatment fair. Patient has imrpoved function, edema is slowly resolving.    Plan: Continue per plan of care.      Daily Treatment Diary     Precautions: Standard  CO-MORBIDITIES:DJD wrist  HEP ACCESS CODE: wrist ROM, TGES  FOTO Completed On: 25    POC Expires Reeval for Medicare to be completed  Auth Expiration Date PT/OT/STVisit Limit   25 By visit 10  BOMN    Completed on visit              Auth Status DATE  6   NA Visit # 8 9 10   6 7    Remaining          MANUAL THERAPY                    retrograde 3m 3m 3m 3m 3m 3m 3m   STM CT/ mid carpal 3m 3m 3m 3m 3m 3m 3m   Intrinsic/extrinsic stretches 2m 2m 2m 2m 2m 2m 2m   CT stretch 2m 2m 2m 2m 2m 2m 2m   Scar mob 2m 2m 2m 2m 2m 2m 2m   THERAPEUTIC EXERCISE HEP          Wrist PROM  2m 2m 2m   2m 2m   Wrist AROM  #2 3x10 #2 3x10 #2 3x10   #2 3x10 #2 3x10   Hook/   Wooden pegs Wooden pegs w G clip G clip   Wooden pegs Wooden pegs   gripping  RPW 2x30 GPW 2x30 GPW 2x30   RPW 2x30 RPW 2x30   P/S AROM  R 3x10 G 3x10 G 3x10   R 3x10 R 3x10   MFR roller- self  3m         Clips key pinch       R  R   Handhelper w blocks   2 red 1 set 2 red        Ball lifts   R 3x10 R 3x10                                                                         NEUROMUSCULAR REEDUCATION                                                                                                                                                                      THERAPEUTIC ACTIVITY           Peg       x30     opposition                                                                                        MODALITIES          MHP L 6m  6m 6m 6m 6m  6m   CP post    5m 5m  5m                   Auth Status DATE 5/7 5/12 5/14 5/19 5/22 5/30 6/2   NA Visit # 1 2 3 4 5 6 7    Remaining          MANUAL THERAPY                    retrograde 3m 3m 3m 3m 3m 3m 3m   STM CT/ mid carpal 3m 3m 3m 3m 3m 3m 3m   Intrinsic/extrinsic stretches 2m 2m 2m 2m 2m 2m 2m   CT stretch 2m 2m 2m 2m 2m 2m 2m   Scar mob 2m 2m 2m 2m 2m 2m 2m   THERAPEUTIC EXERCISE HEP Wrist ROM/TGE         Wrist PROM  2m 2m 2m 2m 2m 2m 2m   Wrist AROM   Cone 3x10 Cone 3x10 #1 3x10 #1 3x10 #2 3x10 #2 3x10   Hook/   Wooden pegs Wooden pegs Wooden pegs Wooden pegs Wooden pegs Wooden pegs Wooden pegs   gripping    YPW 2x30 YPW 2x30 YPW 2x30 RPW 2x30 RPW 2x30   P/S AROM     Y 3x10 Y 3x10 R 3x10 R 3x10   MFR roller- self      3m     Clips key pinch       R  R                                                                                           NEUROMUSCULAR REEDUCATION                                                                                                                                                                      THERAPEUTIC ACTIVITY           Peg       x30     opposition                                                                                        MODALITIES          MHP L 6m 6m 6m 6m 6m  6m   CP post    5m 5m  5m

## 2025-06-16 ENCOUNTER — OFFICE VISIT (OUTPATIENT)
Dept: OCCUPATIONAL THERAPY | Facility: CLINIC | Age: 70
End: 2025-06-16
Attending: ORTHOPAEDIC SURGERY
Payer: COMMERCIAL

## 2025-06-16 DIAGNOSIS — M25.532 LEFT WRIST PAIN: Primary | ICD-10-CM

## 2025-06-16 PROCEDURE — 97140 MANUAL THERAPY 1/> REGIONS: CPT | Performed by: OCCUPATIONAL THERAPIST

## 2025-06-16 PROCEDURE — 97110 THERAPEUTIC EXERCISES: CPT | Performed by: OCCUPATIONAL THERAPIST

## 2025-06-20 ENCOUNTER — OFFICE VISIT (OUTPATIENT)
Dept: OCCUPATIONAL THERAPY | Facility: CLINIC | Age: 70
End: 2025-06-20
Attending: ORTHOPAEDIC SURGERY
Payer: COMMERCIAL

## 2025-06-20 DIAGNOSIS — M25.532 LEFT WRIST PAIN: Primary | ICD-10-CM

## 2025-06-20 PROCEDURE — 97140 MANUAL THERAPY 1/> REGIONS: CPT | Performed by: OCCUPATIONAL THERAPIST

## 2025-06-20 PROCEDURE — 97110 THERAPEUTIC EXERCISES: CPT | Performed by: OCCUPATIONAL THERAPIST

## 2025-06-20 NOTE — PROGRESS NOTES
Daily Note     Today's date: 2025  Patient name: Rafael Newberry  : 1955  MRN: [id number]  Referring provider: Dexter Chaves MD  Dx:   Encounter Diagnosis     ICD-10-CM    1. Left wrist pain  M25.532                      Subjective: I called the PA, My hand feels more numb.        Objective: See treatment diary below      Assessment: Tolerated treatment fair. Patient continues with complaint of decreased sensation of the hand.  Overall his function is improved with ROM and strength.    Plan: Continue per plan of care.      Daily Treatment Diary     Precautions: Standard  CO-MORBIDITIES:DJD wrist  HEP ACCESS CODE: wrist ROM, TGES  FOTO Completed On: 25    POC Expires Reeval for Medicare to be completed  Auth Expiration Date PT/OT/STVisit Limit   25 By visit 10  BOMN    Completed on visit              Auth Status DATE      NA Visit # 8 9 10 11 12 6 7    Remaining          MANUAL THERAPY                    retrograde 3m 3m 3m 3m 3m 3m 3m   STM CT/ mid carpal 3m 3m 3m 3m 3m 3m 3m   Intrinsic/extrinsic stretches 2m 2m 2m 2m 2m 2m 2m   CT stretch 2m 2m 2m 2m 2m 2m 2m   Scar mob 2m 2m 2m 2m 2m 2m 2m   THERAPEUTIC EXERCISE HEP          Wrist PROM  2m 2m 2m 2m 2m 2m 2m   Wrist AROM  #2 3x10 #2 3x10 #2 3x10 #2 3x10 #2 3x10 #2 3x10 #2 3x10   Hook/   Wooden pegs Wooden pegs w G clip G clip G clip Woodepn pegs Wooden pegs Wooden pegs   gripping  RPW 2x30 GPW 2x30 GPW 2x30 GPW 2x30 GPW 2x30 RPW 2x30 RPW 2x30   P/S AROM  R 3x10 G 3x10 G 3x10 G 3x10 G 3x10 R 3x10 R 3x10   MFR roller- self  3m         Clips key pinch       R  R   Handhelper w blocks   2 red 1 set 2 red        Ball lifts   R 3x10 R 3x10 R 3x10 G 3x10                                                                       NEUROMUSCULAR REEDUCATION                                                                                                                                                                      THERAPEUTIC  ACTIVITY           Peg       x30     opposition                                                                                        MODALITIES          MHP L 6m 6m 6m 6m 6m  6m   CP post       5m                   Auth Status DATE 5/7 5/12 5/14 5/19 5/22 5/30 6/2   NA Visit # 1 2 3 4 5 6 7    Remaining          MANUAL THERAPY                    retrograde 3m 3m 3m 3m 3m 3m 3m   STM CT/ mid carpal 3m 3m 3m 3m 3m 3m 3m   Intrinsic/extrinsic stretches 2m 2m 2m 2m 2m 2m 2m   CT stretch 2m 2m 2m 2m 2m 2m 2m   Scar mob 2m 2m 2m 2m 2m 2m 2m   THERAPEUTIC EXERCISE HEP Wrist ROM/TGE         Wrist PROM  2m 2m 2m 2m 2m 2m 2m   Wrist AROM   Cone 3x10 Cone 3x10 #1 3x10 #1 3x10 #2 3x10 #2 3x10   Hook/   Wooden pegs Wooden pegs Wooden pegs Wooden pegs Wooden pegs Wooden pegs Wooden pegs   gripping    YPW 2x30 YPW 2x30 YPW 2x30 RPW 2x30 RPW 2x30   P/S AROM     Y 3x10 Y 3x10 R 3x10 R 3x10   MFR roller- self      3m     Clips key pinch       R  R                                                                                           NEUROMUSCULAR REEDUCATION                                                                                                                                                                      THERAPEUTIC ACTIVITY           Peg       x30     opposition                                                                                        MODALITIES          MHP L 6m 6m 6m 6m 6m  6m   CP post    5m 5m  5m

## 2025-06-23 ENCOUNTER — OFFICE VISIT (OUTPATIENT)
Dept: OCCUPATIONAL THERAPY | Facility: CLINIC | Age: 70
End: 2025-06-23
Attending: ORTHOPAEDIC SURGERY
Payer: COMMERCIAL

## 2025-06-23 DIAGNOSIS — M25.532 LEFT WRIST PAIN: Primary | ICD-10-CM

## 2025-06-23 PROCEDURE — 97110 THERAPEUTIC EXERCISES: CPT | Performed by: OCCUPATIONAL THERAPIST

## 2025-06-23 PROCEDURE — 97140 MANUAL THERAPY 1/> REGIONS: CPT | Performed by: OCCUPATIONAL THERAPIST

## 2025-06-23 NOTE — PROGRESS NOTES
Daily Note     Today's date: 2025  Patient name: Rafael Newberry  : 1955  MRN: [id number]  Referring provider: Dexter Chaves MD  Dx:   Encounter Diagnosis     ICD-10-CM    1. Left wrist pain  M25.532                      Subjective: No new complaints.      Objective: See treatment diary below      Assessment: Tolerated treatment fair. Patient is making good progress with his functional strength despite his diminished sensation and tightness of the hand.    .    Plan: Continue per plan of care.      Daily Treatment Diary     Precautions: Standard  CO-MORBIDITIES:DJD wrist  HEP ACCESS CODE: wrist ROM, TGES  FOTO Completed On: 25    POC Expires Reeval for Medicare to be completed  Auth Expiration Date PT/OT/STVisit Limit   25 By visit 10  BOMN    Completed on visit              Auth Status DATE     NA Visit # 8 9 10 11 12 13 7    Remaining          MANUAL THERAPY                    retrograde 3m 3m 3m 3m 3m 3m 3m   STM CT/ mid carpal 3m 3m 3m 3m 3m 3m 3m   Intrinsic/extrinsic stretches 2m 2m 2m 2m 2m 2m 2m   CT stretch 2m 2m 2m 2m 2m 2m 2m   Scar mob 2m 2m 2m 2m 2m 2m 2m   THERAPEUTIC EXERCISE HEP          Wrist PROM  2m 2m 2m 2m 2m 2m 2m   Wrist AROM  #2 3x10 #2 3x10 #2 3x10 #2 3x10 #2 3x10 #2 3x10 #2 3x10   Hook/   Wooden pegs Wooden pegs w G clip G clip G clip Woodepn pegs Wooden pegs Wooden pegs   gripping  RPW 2x30 GPW 2x30 GPW 2x30 GPW 2x30 GPW 2x30 RPW 2x30 RPW 2x30   P/S AROM  R 3x10 G 3x10 G 3x10 G 3x10 G 3x10 R 3x10 R 3x10   MFR roller- self  3m         Clips key pinch       R  R   Handhelper w blocks   2 red 1 set 2 red        Ball lifts   R 3x10 R 3x10 R 3x10 G 3x10 G 3x10                                                                      NEUROMUSCULAR REEDUCATION                                                                                                                                                                      THERAPEUTIC ACTIVITY            Peg       x30     opposition                                                                                        MODALITIES          MHP L 6m 6m 6m 6m 6m 6m 6m   CP post       5m                   Auth Status DATE 5/7 5/12 5/14 5/19 5/22 5/30 6/2   NA Visit # 1 2 3 4 5 6 7    Remaining          MANUAL THERAPY                    retrograde 3m 3m 3m 3m 3m 3m 3m   STM CT/ mid carpal 3m 3m 3m 3m 3m 3m 3m   Intrinsic/extrinsic stretches 2m 2m 2m 2m 2m 2m 2m   CT stretch 2m 2m 2m 2m 2m 2m 2m   Scar mob 2m 2m 2m 2m 2m 2m 2m   THERAPEUTIC EXERCISE HEP Wrist ROM/TGE         Wrist PROM  2m 2m 2m 2m 2m 2m 2m   Wrist AROM   Cone 3x10 Cone 3x10 #1 3x10 #1 3x10 #2 3x10 #2 3x10   Hook/   Wooden pegs Wooden pegs Wooden pegs Wooden pegs Wooden pegs Wooden pegs Wooden pegs   gripping    YPW 2x30 YPW 2x30 YPW 2x30 RPW 2x30 RPW 2x30   P/S AROM     Y 3x10 Y 3x10 R 3x10 R 3x10   MFR roller- self      3m     Clips key pinch       R  R                                                                                           NEUROMUSCULAR REEDUCATION                                                                                                                                                                      THERAPEUTIC ACTIVITY           Peg       x30     opposition                                                                                        MODALITIES          MHP L 6m 6m 6m 6m 6m  6m   CP post    5m 5m  5m

## 2025-06-27 ENCOUNTER — OFFICE VISIT (OUTPATIENT)
Dept: OCCUPATIONAL THERAPY | Facility: CLINIC | Age: 70
End: 2025-06-27
Attending: ORTHOPAEDIC SURGERY
Payer: COMMERCIAL

## 2025-06-27 DIAGNOSIS — M25.532 LEFT WRIST PAIN: Primary | ICD-10-CM

## 2025-06-27 PROCEDURE — 97110 THERAPEUTIC EXERCISES: CPT | Performed by: OCCUPATIONAL THERAPIST

## 2025-06-27 PROCEDURE — 97140 MANUAL THERAPY 1/> REGIONS: CPT | Performed by: OCCUPATIONAL THERAPIST

## 2025-06-27 NOTE — PROGRESS NOTES
Daily Note     Today's date: 2025  Patient name: Rafael Newberry  : 1955  MRN: [id number]  Referring provider: Dexter Chaves MD  Dx:   Encounter Diagnosis     ICD-10-CM    1. Left wrist pain  M25.532                      Subjective: I am going to see the doctor again.      Objective: See treatment diary below      Assessment: Tolerated treatment fair. Patient has achieved functional ROM of the hand and wrist.  He continues with pain in the hand from arthritis, continues with mild swelling in the hand with activities.  His sensation has not resolved in the median N.  Pt. Has resumed all aDLs at home despite symptoms.  Pt. Will f/u with MD for tx.  FOTO score achieved and goals met with therapy.  Cotninue with HEP independently.    Goals  STG( 4 visits)  1. Compliant with HEP/splint.- met  2. rEduce edema in the L hand to WNLs for ROM- met  3. rEduce pain to less than 6/10 for function.- met  LTG( 12 visits or discharge)  1. Regain full AROM of the hand and wrist for function.- met  2. Pain free L hand use with ADLS.- not met  3. L  and pinch strength WFLs for RTW - met  4. Improve FOTO score to predicted outcome or greater.- met    .    Plan: D/C from OT>       Daily Treatment Diary     Precautions: Standard  CO-MORBIDITIES:DJD wrist  HEP ACCESS CODE: wrist ROM, TGES  FOTO Completed On: 25    POC Expires Reeval for Medicare to be completed  Auth Expiration Date PT/OT/STVisit Limit   25 By visit 10  BOMN    Completed on visit              Auth Status DATE    NA Visit # 8 9 10 11 12 13 14    Remaining          MANUAL THERAPY                    retrograde 3m 3m 3m 3m 3m 3m 3m   STM CT/ mid carpal 3m 3m 3m 3m 3m 3m 3m   Intrinsic/extrinsic stretches 2m 2m 2m 2m 2m 2m 2m   CT stretch 2m 2m 2m 2m 2m 2m 2m   Scar mob 2m 2m 2m 2m 2m 2m 2m   THERAPEUTIC EXERCISE HEP          Wrist PROM  2m 2m 2m 2m 2m 2m 2m   Wrist AROM  #2 3x10 #2 3x10 #2 3x10 #2 3x10 #2 3x10 #2  3x10 #3 3x10   Hook/   Wooden pegs Wooden pegs w G clip G clip G clip Woodepn pegs Wooden pegs Wooden pegs   gripping  RPW 2x30 GPW 2x30 GPW 2x30 GPW 2x30 GPW 2x30 RPW 2x30 RPW 2x30   P/S AROM  R 3x10 G 3x10 G 3x10 G 3x10 G 3x10 R 3x10 R 3x10   MFR roller- self  3m         Clips key pinch       R  R   Handhelper w blocks   2 red 1 set 2 red        Ball lifts   R 3x10 R 3x10 R 3x10 G 3x10 G 3x10 G 3x10                                                                     NEUROMUSCULAR REEDUCATION                                                                                                                                                                      THERAPEUTIC ACTIVITY           Peg       x30     opposition                                                                                        MODALITIES          MHP L 6m 6m 6m 6m 6m 6m 6m   CP post                          Auth Status DATE 5/7 5/12 5/14 5/19 5/22 5/30 6/2   NA Visit # 1 2 3 4 5 6 7    Remaining          MANUAL THERAPY                    retrograde 3m 3m 3m 3m 3m 3m 3m   STM CT/ mid carpal 3m 3m 3m 3m 3m 3m 3m   Intrinsic/extrinsic stretches 2m 2m 2m 2m 2m 2m 2m   CT stretch 2m 2m 2m 2m 2m 2m 2m   Scar mob 2m 2m 2m 2m 2m 2m 2m   THERAPEUTIC EXERCISE HEP Wrist ROM/TGE         Wrist PROM  2m 2m 2m 2m 2m 2m 2m   Wrist AROM   Cone 3x10 Cone 3x10 #1 3x10 #1 3x10 #2 3x10 #2 3x10   Hook/   Wooden pegs Wooden pegs Wooden pegs Wooden pegs Wooden pegs Wooden pegs Wooden pegs   gripping    YPW 2x30 YPW 2x30 YPW 2x30 RPW 2x30 RPW 2x30   P/S AROM     Y 3x10 Y 3x10 R 3x10 R 3x10   MFR roller- self      3m     Clips key pinch       R  R                                                                                           NEUROMUSCULAR REEDUCATION                                                                                                                                                                      THERAPEUTIC ACTIVITY            Peg       x30     opposition                                                                                        MODALITIES          MHP L 6m 6m 6m 6m 6m  6m   CP post    5m 5m  5m